# Patient Record
Sex: MALE | Race: WHITE | NOT HISPANIC OR LATINO | URBAN - METROPOLITAN AREA
[De-identification: names, ages, dates, MRNs, and addresses within clinical notes are randomized per-mention and may not be internally consistent; named-entity substitution may affect disease eponyms.]

---

## 2019-10-28 ENCOUNTER — TRANSFERRED RECORDS (OUTPATIENT)
Dept: HEALTH INFORMATION MANAGEMENT | Facility: CLINIC | Age: 24
End: 2019-10-28

## 2021-10-19 ENCOUNTER — TRANSFERRED RECORDS (OUTPATIENT)
Dept: HEALTH INFORMATION MANAGEMENT | Facility: CLINIC | Age: 26
End: 2021-10-19

## 2022-01-18 ENCOUNTER — TRANSFERRED RECORDS (OUTPATIENT)
Dept: HEALTH INFORMATION MANAGEMENT | Facility: CLINIC | Age: 27
End: 2022-01-18

## 2022-05-24 ENCOUNTER — TRANSFERRED RECORDS (OUTPATIENT)
Dept: HEALTH INFORMATION MANAGEMENT | Facility: CLINIC | Age: 27
End: 2022-05-24

## 2023-03-20 ENCOUNTER — TELEPHONE (OUTPATIENT)
Dept: PSYCHOLOGY | Facility: CLINIC | Age: 28
End: 2023-03-20
Payer: MEDICAID

## 2023-03-29 ENCOUNTER — OFFICE VISIT (OUTPATIENT)
Dept: FAMILY MEDICINE | Facility: CLINIC | Age: 28
End: 2023-03-29
Payer: MEDICAID

## 2023-03-29 VITALS
WEIGHT: 280 LBS | OXYGEN SATURATION: 97 % | DIASTOLIC BLOOD PRESSURE: 84 MMHG | HEART RATE: 114 BPM | RESPIRATION RATE: 18 BRPM | BODY MASS INDEX: 41.47 KG/M2 | SYSTOLIC BLOOD PRESSURE: 121 MMHG | TEMPERATURE: 98.5 F | HEIGHT: 69 IN

## 2023-03-29 DIAGNOSIS — Z01.00 VISIT FOR EYE AND VISION EXAM: Primary | ICD-10-CM

## 2023-03-29 DIAGNOSIS — F32.5 MAJOR DEPRESSIVE DISORDER IN FULL REMISSION, UNSPECIFIED WHETHER RECURRENT (H): ICD-10-CM

## 2023-03-29 DIAGNOSIS — F64.9 GENDER DYSPHORIA: ICD-10-CM

## 2023-03-29 DIAGNOSIS — F90.2 ATTENTION DEFICIT HYPERACTIVITY DISORDER (ADHD), COMBINED TYPE: ICD-10-CM

## 2023-03-29 DIAGNOSIS — F41.1 GAD (GENERALIZED ANXIETY DISORDER): ICD-10-CM

## 2023-03-29 DIAGNOSIS — Z76.89 ENCOUNTER TO ESTABLISH CARE: ICD-10-CM

## 2023-03-29 PROCEDURE — 99213 OFFICE O/P EST LOW 20 MIN: CPT | Mod: GC

## 2023-03-29 NOTE — PATIENT INSTRUCTIONS
Dental Clinic Resource List  Drafted October 2022    Name/Address/Phone/Hours Hours & Good info to know   [facility] - [place]  [address]  [place] [address]  Phone: [phone]  [email or web address]    Hours: Mon-Th: 730a-5p; Fri: 7a-4p.    *Offers Nitrous, oral and IV conscious sedation.    Serves Children & adults. *As of 10/2022 - not accepting new pediatric patients.    Income based dental plans available if no dental insurance.   Accepts MA & private insurance.   [facility] [place] [address] locations: [address] [place] [address] & [address] [place]  Phone: [phone]  Fax: [phone]  [email or web address]    Hours: M: 830a-5p; Tu: 830a - 8p; W: 830a-7p; Th: 830a-5p; Fri: 830a-5p; Sat: 9a-1p; Closed Sun. *Offers Nitrous Oxide    Serves Children & Adults.     Adults need to have an exam first and then will be seen again after that.     Accepts MA & Sliding scale. If using sliding scale they request most recent income tax forms (1040 or 1040a) & Pay stubs x 2 months for all working members in household.   [address] [place] [address]  Phone: [phone]  Fax: [phone]  [email or web address]    Hours: M-F: 730a - 330 pm. Closed Sat/Sun.  *Offers Nitrous Oxide    Serves Children & Adults    If uninsured - offers sliding scale based on income level & family size. Need to submit paycheck stubs & last year's income tax filing.   Accepts MA & private insurance.   [facility]  [address] [place] [address]  Phone: [phone]  Fax: [phone]  [email or web address]    Hours: M - Th: 815am - 5 pm; F: 815 am - 2pm. Closed Sat/Sun.  *Offers Nitrous Oxide    Serves Children & Adults    If uninsured - offers sliding scale based on income level & family size. Need to submit paycheck stubs & last year's income tax filing.   Accepts MA & private insurance.     [facility]  [facility]  [address] [place] [address]  Phone (Dental): [phone]  Main: [phone]  [email or web address]    Hours: Mon-Fri: 8a-430 pm. Closed Sat/Sun.   *Offers Nitrous Oxide    Walk in Dental services available.  Sliding fee Discount Program available if uninsured.   Assistance on site to help apply for MNSure, Medical Assistance and dental coverage for children.   Accepts MA, private insurance & Medicare.    [facility] [place]  [address]  [place] [address]  Phone: [phone]    [email or web address]    Hours: Mon-Fri: 730am - 430 pm. Closed Sat/Sun. *Offers Nitrous Oxide    Serves Children & Adults  *Limited availability for accepting new patients - mainly only accepting pediatrics as of 10/2022.    Accepts insured, uninsured and underinsured patients.    [facility]  [address]  [address]  [place] [address]  Phone: [phone]    [email or web address]    Hours: Mon - Thu: 8am - 9 pm, Fri: 8am 0- 4 pm. Closed Sat/Sun.   *Unknown if it offers nitrous oxide.    Will serve anyone who does not have dental insurance. All dental treatment is free. They request a $20 fee per visit for administrative costs.  No walk-in appointments.   [facility] [place]  [address] [place] [address]    [email or web address]    Hours: Mon: 830am- 5pm; T: 930 am-5pm; Wed-Fri: 830 am -5 pm. Closed Sat/Sun. *May offer Nitrous Oxide in certain circumstances.               [facility] [place]  [address] [place] [address]  Main: [phone]; Fax: [phone]  *Located in [facility] - Parking is Free in [address] off of [address]. Enter first floor entrance of the [facility] (Door [facility]). Take the elevator to  the 2nd floor and follow signs to the dental clinic.     [email or web address]    Hours: Hours vary by semester. Mid September- Mid December: Open Mondays, Tuesdays and Wednesdays. Late January-Early May: Open Mondays, Tuesday, Wednesdays, Thursdays. Fridays (Beginning early February)           *Offers Nitrous Oxide at no additional cost.    No insurance accepted. Cash/Visa/Mastercard/Discover/Check   [facility] [place]  [address] [place] [address]  Appt Line: [phone]    [email or web address]    Hours: Mon-F: 830 am - 5 pm. *As of 10/2022 - not currently accepting new dental patients.    Serves All ages.    Accepts most insurance and helps with benefits enrollment  Offers income-based discounts.  Helps arrange payment plans.   [facility]  Only at [place] - [address] [place] [address]  Main Line: [phone]    [email or web address]    Hours: M/W/Th/F: 8am - 5pm. No dental services Tuesday. Closed weekends.   *No dental walk-ins.    Sliding fee options available.          [facility] [place]  [address] [place] [address]  Phone: [phone]    Hours: M-F: 8am - 430 pm         *Does not offer nitrous oxide.    Accepts limited walk-in appointments.   [employer] [address]  Free parking behind the building for patients.    [facility] [place]  [address] [place] [address]  Dental line: [phone]  Fax: [phone]    [email or web address]    Hours: M-F: 7am -  5pm   *Does not offer Nitrous Oxide    Accepts public and private insurance.   Offers many dental services on a sliding fee scale.   [facility]  [address] [place] [address]  Phone: [phone]  Scheduling line  available 7am - 7pm.  Fax: [phone]    [email or web address]    Hours: M-F: 8am - 5pm. Tuesday & Wed evenings until 8 pm.  *Offers nitrous oxide for dental visits    Serves all ages.    Sliding fee discount program based on household income.    [facility] - [address] [address]  Phone: [phone]    [email or web address]/   *Does not offer nitrous oxide    Accepts MA.      Patient Education   Here is the plan from today's visit    1. Visit for eye and vision exam  - Adult Eye  Referral; Future    2. Major depressive disorder in full remission, unspecified whether recurrent (H)  - Adult Mental Health  Referral; Future  - Adult Mental Health  Referral; Future    3. Gender dysphoria  - Adult Mental Health  Referral; Future  - Adult Mental Health  Referral; Future    4. TANG (generalized anxiety disorder)  - Adult Mental Health  Referral; Future  - Adult Mental Health  Referral; Future    5. Attention deficit hyperactivity disorder (ADHD), combined type  - Adult Mental Health  Referral; Future  - Adult Mental Health  Referral; Future          Please call or return to clinic if your symptoms don't go away.    Follow up plan  Return in about 2 weeks (around 4/12/2023).    Thank you for coming to [facility] today.  Lab Testing:  **If you had lab testing today and your results are reassuring or normal they will be mailed to you or sent through [employer] within 7 days.   **If the lab tests need quick action we will call you with the results.  **If you are having labs done on a different day, please call [phone] to schedule at [facility] or [phone] for other [facility] Outpatient Lab locations. Labs do not offer walk-in appointments.  The phone number we will call with results is # [phone] (home) . If this is not the  best number please call our clinic and change the number.  Medication Refills:  If you need any refills please call your pharmacy and they will contact us.   If you need to  your refill at a new pharmacy, please contact the new pharmacy directly. The new pharmacy will help you get your medications transferred faster.   Scheduling:  If you have any concerns about today's visit or wish to schedule another appointment please call our office during normal business hours [phone] (8-5:00 M-F). If you can no longer make a scheduled visit, please cancel via [employer] or call us to cancel.   If a referral was made to an [facility] specialty provider and you do not get a call from central scheduling, please refer to directions on your visit summary or call our office during normal business hours for assistance.   If a Mammogram was ordered for you at the Breast Center call [phone] to schedule or change your appointment.  If you had an XRay/CT/Ultrasound/MRI ordered the number is [phone] to schedule or change your radiology appointment.   [facility] has limited ultrasound appointments available on Wednesdays, if you would like your ultrasound at [facility], please call [phone] to schedule.   Medical Concerns:  If you have urgent medical concerns please call [phone] at any time of the day.    Kaur Sanchez MD

## 2023-03-29 NOTE — PROGRESS NOTES
Assessment & Plan     Major depressive disorder in full remission, unspecified whether recurrent (H)  Has been stable on lexapro for multiple years.  Has been out of Lexapro for 4 months.  No current SI or thoughts of harming herself, has not had these thoughts for many years.  Cannot remember current dose of Lexapro.  -TRISH signed by patient, will represcribe Lexapro once records are obtained  - Adult Mental Health  Referral; Future    TANG (generalized anxiety disorder)  Anxiety and stress are her predominant symptoms that she is experiencing; however she reports that this has been pretty well controlled.  Has had 3 panic attacks in the past 6 months.  -TRISH signed by patient, will represcribe Lexapro once records are obtained  - Adult Mental Health  Referral; Future    Attention deficit hyperactivity disorder (ADHD), combined type  Has a history of ADHD diagnosed as a child, has been off her ADHD medication for over 10 years.  Does not have record of any ADHD testing.  Feels like she has been having trouble focusing and would like to restart HD medications.  Referral provided for ADHD testing,  - Adult Mental Health  Referral; Future    Gender dysphoria  History of gender dysphoria for many years, has been stable on estrogen and spironolactone.  Cannot remember the dosing, has been taking oral estrogen.  Has been happy with her body and does not desire any more changes right now.  -TRISH signed by patient, will represcribe estrogen once records are obtained  - Adult Mental [facility]  Referral; Future    Visit for eye and vision exam  Feels like her vision has been getting blurrier and harder to see objects at a distance.  No history of wearing contacts or glasses.  - Adult Eye  Referral; Future    Return in about 2 weeks (around 4/12/2023).   To follow up on mood, erectile dysfunction.    Kaur Sanchez MD  [facility] VALE Tomlin is a 27  year old, presenting for the following health issues:  Establish Care (Patient here to establish care)    Additional Questions 3/29/2023   Roomed by Sharri   Accompanied by Silviano (Partner)     Patient Reported Additional Medications 3/29/2023   Patient reports taking the following new medications Seroquel, estradol, spironolacton, welbutrin, lexapro (dosages need consideration)     HPI   -Patient has been living in [place] for 6 months; was living in [place] prior.  Here to establish care.  -Moved to be closer to her primary's family; currently lives in the same apartment building [place] the [address]  -History of MDD: Feels like this is currently very stable.  Does have a history of suicide attempt back in high school but has not felt this way in over 8 years.  Does not feel like depression is affecting her life currently.  No current SI or recent self-harm.  -Additionally has a history of anxiety and panic attacks.  Also feels like the symptoms are better than compared to what they used to be but could also be improved.  Has had 3 panic attacks in the past 6 months.  -Ran out of her medications 4 months ago and had not establish care yet to get refills.  Was taking Seroquel, Lexapro, estrogen, spironolactone, and Wellbutrin  -Has identified as female and she was in grade school.  Has been taking estrogen for 6 to 7 years.  Has been happy on her current dose.  Does report some problems with erectile dysfunction.  Was diagnosed with ADHD as a child, took medication for this but cannot remember what it was.  Has not been on medication for 10 years and does not have record of her diagnosis (she did ask her prior medical office if they have records of her diagnosis and I do not).  Would like to do ADHD testing so that she could potentially restart her medication as she feels like she has had a lack of trouble lately with focus and attention  -Was seeing a therapist in [place], felt like this was useful and would like a  "therapist now.  Has a history doing DBT 7 to 8 years ago, since then has been doing CBT        Objective    /84   Pulse 114   Temp 98.5  F (36.9  C) (Oral)   Resp 18   Ht 1.753 m (5' 9\")   Wt 127 kg (280 lb)   SpO2 97%   BMI 41.35 kg/m    Body mass index is 41.35 kg/m .  Physical Exam   GENERAL: healthy, alert and no distress  NECK: no adenopathy, no asymmetry, masses, or scars and thyroid normal to palpation  RESP: lungs clear to auscultation - no rales, rhonchi or wheezes  CV: regular rate and rhythm, normal S1 S2, no S3 or S4, no murmur, click or rub, no peripheral edema and peripheral pulses strong  ABDOMEN: soft, nontender, no hepatosplenomegaly, no masses and bowel sounds normal  MS: no gross musculoskeletal defects noted, no edema      ----- Service Performed and Documented by Resident or Fellow ------            "

## 2023-05-26 ENCOUNTER — OFFICE VISIT (OUTPATIENT)
Dept: FAMILY MEDICINE | Facility: CLINIC | Age: 28
End: 2023-05-26
Payer: COMMERCIAL

## 2023-05-26 VITALS
BODY MASS INDEX: 39.87 KG/M2 | SYSTOLIC BLOOD PRESSURE: 117 MMHG | OXYGEN SATURATION: 100 % | HEART RATE: 110 BPM | WEIGHT: 270 LBS | RESPIRATION RATE: 18 BRPM | DIASTOLIC BLOOD PRESSURE: 76 MMHG

## 2023-05-26 DIAGNOSIS — F41.1 GAD (GENERALIZED ANXIETY DISORDER): ICD-10-CM

## 2023-05-26 DIAGNOSIS — N52.1 ERECTILE DYSFUNCTION DUE TO DISEASES CLASSIFIED ELSEWHERE: ICD-10-CM

## 2023-05-26 DIAGNOSIS — F32.5 MAJOR DEPRESSIVE DISORDER IN FULL REMISSION, UNSPECIFIED WHETHER RECURRENT (H): ICD-10-CM

## 2023-05-26 DIAGNOSIS — Z79.890 HORMONE REPLACEMENT THERAPY: ICD-10-CM

## 2023-05-26 DIAGNOSIS — F64.9 GENDER DYSPHORIA: Primary | ICD-10-CM

## 2023-05-26 LAB
ALBUMIN SERPL BCG-MCNC: 4.4 G/DL (ref 3.5–5.2)
ALP SERPL-CCNC: 120 U/L (ref 35–129)
ALT SERPL W P-5'-P-CCNC: 99 U/L (ref 10–50)
AMPHETAMINES UR QL SCN: ABNORMAL
AST SERPL W P-5'-P-CCNC: 60 U/L (ref 10–50)
BARBITURATES UR QL SCN: ABNORMAL
BENZODIAZ UR QL SCN: ABNORMAL
BILIRUB DIRECT SERPL-MCNC: <0.2 MG/DL (ref 0–0.3)
BILIRUB SERPL-MCNC: 0.3 MG/DL
BZE UR QL SCN: ABNORMAL
CANNABINOIDS UR QL SCN: ABNORMAL
CHOLEST SERPL-MCNC: 200 MG/DL
ETHANOL UR QL SCN: ABNORMAL
FASTING STATUS PATIENT QL REPORTED: NO
GLUCOSE SERPL-MCNC: 95 MG/DL (ref 70–99)
HDLC SERPL-MCNC: 36 MG/DL
HGB BLD-MCNC: 15.1 G/DL (ref 11.7–17.7)
LDLC SERPL CALC-MCNC: 123 MG/DL
NONHDLC SERPL-MCNC: 164 MG/DL
OPIATES UR QL SCN: ABNORMAL
PCP QUAL URINE (ROCHE): ABNORMAL
PROT SERPL-MCNC: 7.8 G/DL (ref 6.4–8.3)
TRIGL SERPL-MCNC: 205 MG/DL

## 2023-05-26 PROCEDURE — 99214 OFFICE O/P EST MOD 30 MIN: CPT | Mod: GC

## 2023-05-26 PROCEDURE — 85018 HEMOGLOBIN: CPT

## 2023-05-26 PROCEDURE — 80061 LIPID PANEL: CPT

## 2023-05-26 PROCEDURE — 80307 DRUG TEST PRSMV CHEM ANLYZR: CPT

## 2023-05-26 PROCEDURE — 36415 COLL VENOUS BLD VENIPUNCTURE: CPT

## 2023-05-26 PROCEDURE — 82947 ASSAY GLUCOSE BLOOD QUANT: CPT

## 2023-05-26 PROCEDURE — 84403 ASSAY OF TOTAL TESTOSTERONE: CPT | Mod: KX

## 2023-05-26 PROCEDURE — 80076 HEPATIC FUNCTION PANEL: CPT

## 2023-05-26 RX ORDER — SPIRONOLACTONE 100 MG/1
100 TABLET, FILM COATED ORAL DAILY
Qty: 90 TABLET | Refills: 1 | Status: SHIPPED | OUTPATIENT
Start: 2023-05-26 | End: 2024-05-03

## 2023-05-26 RX ORDER — QUETIAPINE FUMARATE 50 MG/1
75 TABLET, FILM COATED ORAL AT BEDTIME
Qty: 30 TABLET | Refills: 0 | Status: SHIPPED | OUTPATIENT
Start: 2023-05-26 | End: 2023-09-15

## 2023-05-26 RX ORDER — SILDENAFIL CITRATE 20 MG/1
40 TABLET ORAL PRN
Qty: 60 TABLET | Refills: 0 | Status: SHIPPED | OUTPATIENT
Start: 2023-05-26 | End: 2023-09-15

## 2023-05-26 RX ORDER — LORAZEPAM 0.5 MG/1
0.5 TABLET ORAL EVERY 6 HOURS PRN
Qty: 10 TABLET | Refills: 0 | Status: SHIPPED | OUTPATIENT
Start: 2023-05-26 | End: 2024-09-30

## 2023-05-26 RX ORDER — ESTRADIOL 2 MG/1
6 TABLET ORAL DAILY
Qty: 270 TABLET | Refills: 0 | Status: SHIPPED | OUTPATIENT
Start: 2023-05-26 | End: 2024-04-25

## 2023-05-26 RX ORDER — QUETIAPINE FUMARATE 100 MG/1
100 TABLET, FILM COATED ORAL AT BEDTIME
COMMUNITY
Start: 2022-12-05 | End: 2023-09-15

## 2023-05-26 NOTE — PROGRESS NOTES
Assessment & Plan     Gender dysphoria  Hormone replacement therapy  Patient with a longstanding history of hormone replacement therapy from estradiol (6mg) and spironolactone (100mg).  She is happy with the changes that her body is made.  Was taking her medications for over 3 years prior to moving to [place] where she needed to establish care.  Has not taken her estradiol nor spironolactone in about a year.  Hoping to restart today.  She has been evaluated by psychiatry in the past has done well on her hormone therapy for many years, no active mood disorders that would disbar her from hormone therapy, no medical contraindications.  We will restart today with close follow-up.  We will also obtain baseline labs.  Obtained paper copies of her medication list that were reviewed.  - Hemoglobin; Future  - Lipid panel; Future  - Glucose; Future  - Hepatic panel; Future  - Testosterone total; Future  - Drug abuse screen 8 urine (UR); Future  - estradiol (ESTRACE) 2 MG tablet; Take 3 tablets (6 mg) by mouth daily for 90 days  - spironolactone (ALDACTONE) 100 MG tablet; Take 1 tablet (100 mg) by mouth daily    Erectile dysfunction due to diseases classified elsewhere  Experiences erectile dysfunction when she takes her hormone therapy.  Mostly a bigger problem for her partner and for her.  Discussed sildenafil from her, no medical contraindications.  Counseled on how to safely take medication, titrate down if experiencing side effects, titrate up if an effective dose.  - sildenafil (REVATIO) 20 MG tablet; Take 2 tablets (40 mg) by mouth as needed (for erectile dysfunction)    TANG (generalized anxiety disorder)  Major depressive disorder in full remission, unspecified whether recurrent (H)  Patient with a history of MDD, TANG, and panic attacks.  She had 1 hospitalization when she was in high school auditory and visual hallucinations, at that point was started on Seroquel.  She has been without any of her medications  including Lexapro or Wellbutrin for the past year.  She has been doing quite well without these medications, her mood is good, no pervasive depression or anxiety, no SI or self-harm.  Certainly no auditory or visual hallucinations since that 1 time period in high school.  Given that she has been so stable without her Lexapro or Wellbutrin, will hold from restarting these medications.  She would like to titrate down on her Seroquel as she does not like the metabolic effects.  Given that her mood is so stable and she essentially has been self t titrating down by only taking this medication every other night or so given that she was on her medications, I think it is okay to slowly decrease dose.  We will titrate down to 75 mg with plan for close follow-up.  Lastly, in the past she has used a very small dose of Ativan (0.5mg) only when she has a panic attack.  We will check a UDS today and provide small prescription.  If the number of panic attacks increase for her, reinitiate an SSRI.  - QUEtiapine (SEROQUEL) 50 MG tablet; Take 1.5 tablets (75 mg) by mouth At Bedtime for 30 days  - LORazepam (ATIVAN) 0.5 MG tablet; Take 1 tablet (0.5 mg) by mouth every 6 hours as needed for anxiety  - UDS        Return in about 1 month (around 6/26/2023).  To check in on mood symptoms.    Kaur Sanchez MD  [facility] VALE Randall is a 27 year old, presenting for the following health issues:  RECHECK (Patient here for follow up)        5/26/2023     3:56 PM   Additional Questions   Roomed by Sharri   Accompanied by Silviano (Partner)     HPI   -been only hayde seroqeul, only taking every few nights because running out.  -had some visual hallucinations and auditory back in high school  -Has not been taking any of her medications for a year be on Seroquel 100 mg  -Mood has been really good this past year, no SI, no self-harm, 3 panic attacks in the past year which for her is well controlled          Objective     /76 (BP Location: Left arm, Patient Position: Sitting, Cuff Size: Adult Large)   Pulse 110   Resp 18   Wt 122.5 kg (270 lb)   SpO2 100%   BMI 39.87 kg/m    Body mass index is 39.87 kg/m .  Physical Exam   GENERAL: healthy, alert and no distress  EYES: Eyes grossly normal to inspection  NECK: no asymmetry, masses, or scars  MS: no gross musculoskeletal defects noted, no edema  SKIN: no suspicious lesions or rashes  NEURO: Normal strength and tone, mentation intact and speech normal  PSYCH: mentation appears normal, affect normal/bright      ----- Service Performed and Documented by Resident or Fellow ------

## 2023-05-26 NOTE — PATIENT INSTRUCTIONS
Patient Education   Here is the plan from today's visit    1. Gender dysphoria  - Hemoglobin; Future  - Lipid panel; Future  - Glucose; Future  - Hepatic panel; Future  - Testosterone total; Future  - Drug abuse screen 8 urine (UR); Future  - estradiol (ESTRACE) 2 MG tablet; Take 3 tablets (6 mg) by mouth daily for 90 days  Dispense: 270 tablet; Refill: 0  - spironolactone (ALDACTONE) 100 MG tablet; Take 1 tablet (100 mg) by mouth daily  Dispense: 90 tablet; Refill: 1  - Hemoglobin  - Lipid panel  - Glucose  - Hepatic panel  - Testosterone total  - Drug abuse screen 8 urine (UR)    2. Hormone replacement therapy  - Hemoglobin; Future  - Lipid panel; Future  - Glucose; Future  - Hepatic panel; Future  - Testosterone total; Future  - Drug abuse screen 8 urine (UR); Future  - estradiol (ESTRACE) 2 MG tablet; Take 3 tablets (6 mg) by mouth daily for 90 days  Dispense: 270 tablet; Refill: 0  - spironolactone (ALDACTONE) 100 MG tablet; Take 1 tablet (100 mg) by mouth daily  Dispense: 90 tablet; Refill: 1  - Hemoglobin  - Lipid panel  - Glucose  - Hepatic panel  - Testosterone total  - Drug abuse screen 8 urine (UR)    3. Erectile dysfunction due to diseases classified elsewhere  - sildenafil (REVATIO) 20 MG tablet; Take 2 tablets (40 mg) by mouth as needed (for erectile dysfunction)  Dispense: 60 tablet; Refill: 0    4. TANG (generalized anxiety disorder)  - QUEtiapine (SEROQUEL) 50 MG tablet; Take 1.5 tablets (75 mg) by mouth At Bedtime for 30 days  Dispense: 30 tablet; Refill: 0  - LORazepam (ATIVAN) 0.5 MG tablet; Take 1 tablet (0.5 mg) by mouth every 6 hours as needed for anxiety  Dispense: 10 tablet; Refill: 0    5. Major depressive disorder in full remission, unspecified whether recurrent (H)  - QUEtiapine (SEROQUEL) 50 MG tablet; Take 1.5 tablets (75 mg) by mouth At Bedtime for 30 days  Dispense: 30 tablet; Refill: 0          Please call or return to clinic if your symptoms don't go away.    Follow up plan  No  follow-ups on file.    Thank you for coming to [facility] today.  Lab Testing:  **If you had lab testing today and your results are reassuring or normal they will be mailed to you or sent through [employer] within 7 days.   **If the lab tests need quick action we will call you with the results.  **If you are having labs done on a different day, please call [phone] to schedule at [facility] or [phone] for other [facility] Outpatient Lab locations. Labs do not offer walk-in appointments.  The phone number we will call with results is # [phone] (home) . If this is not the best number please call our clinic and change the number.  Medication Refills:  If you need any refills please call your pharmacy and they will contact us.   If you need to  your refill at a new pharmacy, please contact the new pharmacy directly. The new pharmacy will help you get your medications transferred faster.   Scheduling:  If you have any concerns about today's visit or wish to schedule another appointment please call our office during normal business hours [phone] (8-5:00 M-F). If you can no longer make a scheduled visit, please cancel via [employer] or call us to cancel.   If a referral was made to an [facility] specialty provider and you do not get a call from central scheduling, please refer to directions on your visit summary or call our office during normal business hours for assistance.   If a Mammogram was ordered for you at the Breast Center call [phone] to schedule or change your appointment.  If you had an XRay/CT/Ultrasound/MRI ordered the number is [phone] to schedule or change your radiology appointment.   [facility] has limited ultrasound appointments available on Wednesdays, if you would like your ultrasound at [facility], please call [phone] to schedule.   Medical Concerns:  If you have urgent medical concerns please call [phone] at any time of the  day.    Kaur Sanchez MD

## 2023-05-26 NOTE — LETTER
June 7, 2023      Tonia Rosas  [address] [place] [address]        Dear ,    We are writing to inform you of your test results. Your cholesterol levels and liver enzyme were a little high which is likely related to your diet. When people take in more saturated fats than their body can handle, their cholesterol becomes elevated and their liver can have small injuries to it, which can make liver enzymes elevated. I recommend working on cutting out some unhealthy foods in your diet and continuing to work on moving your body as best you can this summer--walking, biking, swimming, or whatever works for you. We will continue to monitor these labs and likely re-check at your next appointment.    Resulted Orders   Hemoglobin   Result Value Ref Range    Hemoglobin 15.1 11.7 - 17.7 g/dL      Comment:      Sex Specific Reference Ranges:    Female  11.7-15.7  g/dL  Male    13.3-17.7   g/dL      Narrative    The sex of this patient cannot be reliably determined based on discrepancies in demographics (legal sex, sex assigned at birth, gender identity).  Both male and female reference ranges are provided where applicable.  Careful evaluation of the patient s results as compared to the gender specific reference intervals is required in this setting.    Lipid panel   Result Value Ref Range    Cholesterol 200 (H) <200 mg/dL    Triglycerides 205 (H) <150 mg/dL    Direct Measure HDL 36 (L) >=40 mg/dL    LDL Cholesterol Calculated 123 (H) <=100 mg/dL    Non HDL Cholesterol 164 (H) <130 mg/dL    Narrative    Cholesterol  Desirable:  <200 mg/dL    Triglycerides  Normal:  Less than 150 mg/dL  Borderline High:  150-199 mg/dL  High:  200-499 mg/dL  Very High:  Greater than or equal to 500 mg/dL    Direct Measure HDL  Female:  Greater than or equal to 50 mg/dL   Male:  Greater than or equal to 40 mg/dL    LDL Cholesterol  Desirable:  <100mg/dL  Above Desirable:  100-129 mg/dL   Borderline High:  130-159 mg/dL   High:   160-189 mg/dL   Very High:  >= 190 mg/dL    Non HDL Cholesterol  Desirable:  130 mg/dL  Above Desirable:  130-159 mg/dL  Borderline High:  160-189 mg/dL  High:  190-219 mg/dL  Very High:  Greater than or equal to 220 mg/dL   Glucose   Result Value Ref Range    Glucose 95 70 - 99 mg/dL    Patient Fasting > 8hrs? No    Hepatic panel   Result Value Ref Range    Protein Total 7.8 6.4 - 8.3 g/dL    Albumin 4.4 3.5 - 5.2 g/dL    Bilirubin Total 0.3 <=1.2 mg/dL    Alkaline Phosphatase 120 35 - 129 U/L      Comment:      Female:    0-15 days     U/L  15d-1 year   122-469 U/L  1-10 years   142-335 U/L  10-13 years  129-417 U/L  13-15 years   U/L  15-17 years   U/L  17-19 years  45-87 U/L  19 years and older   U/L      Male:  0-15 days     U/L  15d-1 year   122-469 U/L  1-10 years   142-335 U/L  10-13 years  129-417 U/L  13-15 years  116-468 U/L  15-17 years   U/L  17-19 years   U/L  19 years and older   U/L      AST 60 (H) 10 - 50 U/L      Comment:      Female   All ages 10-35 U/L     Male   All ages 10-50 U/L        ALT 99 (H) 10 - 50 U/L      Comment:      Female   All ages 10-35 U/L     Male   All ages 10-50 U/L        Bilirubin Direct <0.20 0.00 - 0.30 mg/dL    Narrative    The sex of this patient cannot be reliably determined based on discrepancies in demographics (legal sex, sex assigned at birth, gender identity).  Both male and female reference ranges are provided where applicable.  Careful evaluation of the patient s results as compared to the gender specific reference intervals is required in this setting.    Testosterone total   Result Value Ref Range    Testosterone Total 326 8 - 950 ng/dL    Narrative    The sex of this patient cannot be reliably determined based on discrepancies in demographics (legal sex, sex assigned at birth, gender identity).  Both male and female reference ranges are provided where applicable.  Careful evaluation of the patient s results as  compared to the gender specific reference intervals is required in this setting.    Drug abuse screen 8 urine (UR)   Result Value Ref Range    Amphetamines Urine Screen Positive (A) Screen Negative      Comment:      Cutoff for a positive amphetamine is 500 ng/mL or greater.   This is an unconfirmed screening result to be used for medical purposes only.    Barbituates Urine Screen Negative Screen Negative      Comment:      Cutoff for a negative barbiturate is less than 200 ng/mL.    Benzodiazepine Urine Screen Negative Screen Negative      Comment:      Cutoff for a negative benzodiazepine is less than 100 ng/mL.    Cannabinoids Urine Screen Positive (A) Screen Negative      Comment:      Cutoff for a positive cannabinoid is 50 ng/mL or greater.   This is an unconfirmed screening result to be used for medical purposes only.    Cocaine Urine Screen Negative Screen Negative      Comment:      Cutoff for a negative cocaine is less than 300 ng/mL.    Ethanol Urine Screen Negative Screen Negative      Comment:      Cutoff for a negative urine ethanol is less than 0.05 g/dL.    Opiates Urine Screen Negative Screen Negative      Comment:      Cutoff for a negative opiate is less than 300 ng/mL.    PCP Urine Screen Negative Screen Negative      Comment:      Cutoff for a negative PCP is less than 25 ng/mL.       If you have any questions or concerns, please call the clinic at the number listed above.       Sincerely,    Kaur Sanchez MD

## 2023-05-31 LAB — TESTOST SERPL-MCNC: 326 NG/DL (ref 8–950)

## 2023-09-15 ENCOUNTER — OFFICE VISIT (OUTPATIENT)
Dept: FAMILY MEDICINE | Facility: CLINIC | Age: 28
End: 2023-09-15
Payer: COMMERCIAL

## 2023-09-15 VITALS
SYSTOLIC BLOOD PRESSURE: 128 MMHG | BODY MASS INDEX: 36.14 KG/M2 | OXYGEN SATURATION: 97 % | RESPIRATION RATE: 16 BRPM | TEMPERATURE: 98.2 F | HEART RATE: 85 BPM | HEIGHT: 69 IN | WEIGHT: 244 LBS | DIASTOLIC BLOOD PRESSURE: 85 MMHG

## 2023-09-15 DIAGNOSIS — Z11.59 NEED FOR HEPATITIS C SCREENING TEST: ICD-10-CM

## 2023-09-15 DIAGNOSIS — N52.1 ERECTILE DYSFUNCTION DUE TO DISEASES CLASSIFIED ELSEWHERE: ICD-10-CM

## 2023-09-15 DIAGNOSIS — R03.0 ELEVATED BLOOD PRESSURE READING WITHOUT DIAGNOSIS OF HYPERTENSION: ICD-10-CM

## 2023-09-15 DIAGNOSIS — Z11.4 SCREENING FOR HIV (HUMAN IMMUNODEFICIENCY VIRUS): ICD-10-CM

## 2023-09-15 DIAGNOSIS — E78.5 HYPERLIPIDEMIA LDL GOAL <100: ICD-10-CM

## 2023-09-15 DIAGNOSIS — R79.89 ELEVATED LFTS: ICD-10-CM

## 2023-09-15 DIAGNOSIS — Z00.00 PREVENTATIVE HEALTH CARE: ICD-10-CM

## 2023-09-15 DIAGNOSIS — F32.5 MAJOR DEPRESSIVE DISORDER IN FULL REMISSION, UNSPECIFIED WHETHER RECURRENT (H): ICD-10-CM

## 2023-09-15 DIAGNOSIS — F64.9 GENDER DYSPHORIA: ICD-10-CM

## 2023-09-15 DIAGNOSIS — F41.1 GAD (GENERALIZED ANXIETY DISORDER): Primary | ICD-10-CM

## 2023-09-15 LAB
CREAT UR-MCNC: 213 MG/DL
MICROALBUMIN UR-MCNC: 14.4 MG/L
MICROALBUMIN/CREAT UR: 6.76 MG/G CR (ref 0–25)

## 2023-09-15 PROCEDURE — 82043 UR ALBUMIN QUANTITATIVE: CPT | Performed by: STUDENT IN AN ORGANIZED HEALTH CARE EDUCATION/TRAINING PROGRAM

## 2023-09-15 PROCEDURE — 80053 COMPREHEN METABOLIC PANEL: CPT | Performed by: STUDENT IN AN ORGANIZED HEALTH CARE EDUCATION/TRAINING PROGRAM

## 2023-09-15 PROCEDURE — 82248 BILIRUBIN DIRECT: CPT | Performed by: STUDENT IN AN ORGANIZED HEALTH CARE EDUCATION/TRAINING PROGRAM

## 2023-09-15 PROCEDURE — 99214 OFFICE O/P EST MOD 30 MIN: CPT | Mod: 25 | Performed by: STUDENT IN AN ORGANIZED HEALTH CARE EDUCATION/TRAINING PROGRAM

## 2023-09-15 PROCEDURE — 36415 COLL VENOUS BLD VENIPUNCTURE: CPT | Performed by: STUDENT IN AN ORGANIZED HEALTH CARE EDUCATION/TRAINING PROGRAM

## 2023-09-15 PROCEDURE — 80061 LIPID PANEL: CPT | Performed by: STUDENT IN AN ORGANIZED HEALTH CARE EDUCATION/TRAINING PROGRAM

## 2023-09-15 PROCEDURE — 90651 9VHPV VACCINE 2/3 DOSE IM: CPT | Performed by: STUDENT IN AN ORGANIZED HEALTH CARE EDUCATION/TRAINING PROGRAM

## 2023-09-15 PROCEDURE — 90471 IMMUNIZATION ADMIN: CPT | Performed by: STUDENT IN AN ORGANIZED HEALTH CARE EDUCATION/TRAINING PROGRAM

## 2023-09-15 PROCEDURE — 90715 TDAP VACCINE 7 YRS/> IM: CPT | Performed by: STUDENT IN AN ORGANIZED HEALTH CARE EDUCATION/TRAINING PROGRAM

## 2023-09-15 PROCEDURE — 87389 HIV-1 AG W/HIV-1&-2 AB AG IA: CPT | Performed by: STUDENT IN AN ORGANIZED HEALTH CARE EDUCATION/TRAINING PROGRAM

## 2023-09-15 PROCEDURE — 90472 IMMUNIZATION ADMIN EACH ADD: CPT | Performed by: STUDENT IN AN ORGANIZED HEALTH CARE EDUCATION/TRAINING PROGRAM

## 2023-09-15 PROCEDURE — 86803 HEPATITIS C AB TEST: CPT | Performed by: STUDENT IN AN ORGANIZED HEALTH CARE EDUCATION/TRAINING PROGRAM

## 2023-09-15 PROCEDURE — 90746 HEPB VACCINE 3 DOSE ADULT IM: CPT | Performed by: STUDENT IN AN ORGANIZED HEALTH CARE EDUCATION/TRAINING PROGRAM

## 2023-09-15 PROCEDURE — 82570 ASSAY OF URINE CREATININE: CPT | Performed by: STUDENT IN AN ORGANIZED HEALTH CARE EDUCATION/TRAINING PROGRAM

## 2023-09-15 RX ORDER — TADALAFIL 10 MG/1
10 TABLET ORAL DAILY PRN
Qty: 10 TABLET | Refills: 0 | Status: SHIPPED | OUTPATIENT
Start: 2023-09-15 | End: 2024-05-03

## 2023-09-15 RX ORDER — LORAZEPAM 0.5 MG/1
0.5 TABLET ORAL EVERY 6 HOURS PRN
Qty: 10 TABLET | Refills: 0 | Status: CANCELLED | OUTPATIENT
Start: 2023-09-15

## 2023-09-15 RX ORDER — SERTRALINE HYDROCHLORIDE 25 MG/1
TABLET, FILM COATED ORAL
Qty: 98 TABLET | Refills: 0 | Status: SHIPPED | OUTPATIENT
Start: 2023-09-15 | End: 2023-11-01

## 2023-09-15 RX ORDER — ESCITALOPRAM OXALATE 5 MG/1
TABLET ORAL
Qty: 42 TABLET | Refills: 0 | Status: SHIPPED | OUTPATIENT
Start: 2023-09-15 | End: 2023-10-06

## 2023-09-15 RX ORDER — ESCITALOPRAM OXALATE 20 MG/1
20 TABLET ORAL EVERY MORNING
COMMUNITY
Start: 2022-12-03 | End: 2024-09-30

## 2023-09-15 RX ORDER — BUPROPION HYDROCHLORIDE 150 MG/1
150 TABLET ORAL EVERY MORNING
Qty: 90 TABLET | Refills: 3 | Status: SHIPPED | OUTPATIENT
Start: 2023-09-15 | End: 2024-09-30

## 2023-09-15 RX ORDER — BUPROPION HYDROCHLORIDE 150 MG/1
150 TABLET ORAL EVERY MORNING
COMMUNITY
Start: 2022-12-05 | End: 2023-09-15

## 2023-09-15 ASSESSMENT — ANXIETY QUESTIONNAIRES
6. BECOMING EASILY ANNOYED OR IRRITABLE: SEVERAL DAYS
5. BEING SO RESTLESS THAT IT IS HARD TO SIT STILL: MORE THAN HALF THE DAYS
2. NOT BEING ABLE TO STOP OR CONTROL WORRYING: NEARLY EVERY DAY
1. FEELING NERVOUS, ANXIOUS, OR ON EDGE: NEARLY EVERY DAY
3. WORRYING TOO MUCH ABOUT DIFFERENT THINGS: NEARLY EVERY DAY
7. FEELING AFRAID AS IF SOMETHING AWFUL MIGHT HAPPEN: MORE THAN HALF THE DAYS
GAD7 TOTAL SCORE: 17
IF YOU CHECKED OFF ANY PROBLEMS ON THIS QUESTIONNAIRE, HOW DIFFICULT HAVE THESE PROBLEMS MADE IT FOR YOU TO DO YOUR WORK, TAKE CARE OF THINGS AT HOME, OR GET ALONG WITH OTHER PEOPLE: VERY DIFFICULT
GAD7 TOTAL SCORE: 17

## 2023-09-15 ASSESSMENT — PATIENT HEALTH QUESTIONNAIRE - PHQ9
SUM OF ALL RESPONSES TO PHQ QUESTIONS 1-9: 17
5. POOR APPETITE OR OVEREATING: NEARLY EVERY DAY

## 2023-09-15 NOTE — PATIENT INSTRUCTIONS
Second Monkeypox vaccine at [facility]    Get your initial COVID vaccines at [facility] or a pharmacy    Changing medication    Week 1: Zoloft: 25 mg, Lexapro 15 mg  Week 2: Zoloft 50 mg, Lexapro 10 mg  Week 3: Zoloft 75 mg, Lexapro 5 mg  Week 4+: Zoloft 100 mg    Check in via Mychart in 2 weeks.  Visit in 4 weeks, in person, bring in your blood pressure cuff    Meanwhile, restart your lamin

## 2023-09-15 NOTE — PROGRESS NOTES
Assessment & Plan     TANG (generalized anxiety disorder)  Major depressive disorder in full remission, unspecified whether recurrent (H)  On max dose lexapro with significant TANG symptoms still. Anxiety > depression. Discussed we can trial a different medication. Will trial Zoloft (cross taper plan below). Could also consider increasing wellbutrin dose as well. Plan for patient to check in via mychart in 2 weeks with follow in 4 weeks.    Week 1: Zoloft: 25 mg, Lexapro 15 mg  Week 2: Zoloft 50 mg, Lexapro 10 mg  Week 3: Zoloft 75 mg, Lexapro 5 mg  Week 4+: Zoloft 100 mg    - buPROPion (WELLBUTRIN XL) 150 MG 24 hr tablet  Dispense: 90 tablet; Refill: 3  - escitalopram (LEXAPRO) 5 MG tablet  Dispense: 42 tablet; Refill: 0  - sertraline (ZOLOFT) 25 MG tablet  Dispense: 98 tablet; Refill: 0    Gender dysphoria  Recheck labs today. Restart lamin. Follow up in 4 weeks; will likely restart estrogen at that point    Erectile dysfunction due to diseases classified elsewhere  Viagra was too expensive. ED 2/2 testosterone used for treatment of gender dysphoria. Will trial cialis. Prefers as needed instead of daily.  - tadalafil (CIALIS) 10 MG tablet  Dispense: 10 tablet; Refill: 0    Elevated LFTs  Previously elevated. Will recheck today  - Hepatic panel    Hyperlipidemia LDL goal <100  Previously elevated. Will recheck today  - Lipid panel reflex to direct LDL Non-fasting    Elevated blood pressure reading without diagnosis of hypertension  Given elevated blood pressure at home; certainly could be an inaccurate cuff size, poorly calibrated machine, or masked hypertension. Plan to have patient bring in machine at next visit for calibration and check kidney function today.  - Basic metabolic panel  - Albumin Random Urine Quantitative with Creat Ratio    Preventative health care  - HEPATITIS B VACCINE ADULT 3 DOSE IM (ENGERIX-B/RECOMBIVAX HB)  - HPV9 (GARDASIL 9)  - HIV Screening  - Hepatitis C Screen Reflex to HCV RNA Quant  "and Genotype  - TDAP 10-64Y (ADACEL,BOOSTRIX)     Screening for HIV (human immunodeficiency virus)  - HIV Screening    Need for hepatitis C screening test  - Hepatitis C Screen Reflex to HCV RNA Quant and Genotype      Return in about 4 weeks (around 10/13/2023) for Follow up mental health.    Fabiola Burnett MD  [facility] VALE Randall is a 27 year old, presenting for the following health issues:  Stress (Higher than normal blood pressure 125-160 systolic measurement with at home BP reader. Tightness in chest. Trouble breathing. Can't get a full breath of air in. Drowsiness. Symptoms for over a month. Sensitive to foods. Unintentional Weight loss)      9/15/2023     2:03 PM   Additional Questions   Roomed by jose alberto   Accompanied by self       HPI     Last seen 5/2023, was doing well gilbert and mdd stand point, so was stopped on lexapro and wellbutrin as she had been without prior to that appointment     RN mom suggested that she restart her lexapro (10 mgx 1 week, now on 20 mg) and Wellbutrin (150 mg)   - helped with some of the symptoms    Has been measuring -150 at home    Chest tightness, not associated with exercise, symptoms improve with exercise (breathing and tightness)  270# at last appointment, #244 today    Anxiety > depression    Just started school, got a roommate, and lots of changes when all this happened        Objective    /85   Pulse 85   Temp 98.2  F (36.8  C) (Oral)   Resp 16   Ht 1.753 m (5' 9\")   Wt 110.7 kg (244 lb)   SpO2 97%   BMI 36.03 kg/m    Body mass index is 36.03 kg/m .  Physical Exam  Vitals reviewed.   Constitutional:       General: She is not in acute distress.     Appearance: Normal appearance. She is not ill-appearing, toxic-appearing or diaphoretic.   HENT:      Head: Normocephalic and atraumatic.   Eyes:      Conjunctiva/sclera: Conjunctivae normal.   Cardiovascular:      Rate and Rhythm: Normal rate.   Pulmonary:      Effort: Pulmonary " effort is normal. No respiratory distress.   Neurological:      Mental Status: She is alert.   Psychiatric:         Mood and Affect: Mood normal.         Behavior: Behavior normal.         Thought Content: Thought content normal.         Judgment: Judgment normal.

## 2023-09-16 LAB
ALBUMIN SERPL BCG-MCNC: 4.5 G/DL (ref 3.5–5.2)
ALP SERPL-CCNC: 94 U/L (ref 35–129)
ALT SERPL W P-5'-P-CCNC: 38 U/L (ref 0–70)
ANION GAP SERPL CALCULATED.3IONS-SCNC: 11 MMOL/L (ref 7–15)
AST SERPL W P-5'-P-CCNC: 25 U/L (ref 0–45)
BILIRUB DIRECT SERPL-MCNC: <0.2 MG/DL (ref 0–0.3)
BILIRUB SERPL-MCNC: 0.3 MG/DL
BUN SERPL-MCNC: 11.1 MG/DL (ref 6–20)
CALCIUM SERPL-MCNC: 9.7 MG/DL (ref 8.6–10)
CHLORIDE SERPL-SCNC: 106 MMOL/L (ref 98–107)
CHOLEST SERPL-MCNC: 169 MG/DL
CREAT SERPL-MCNC: 0.75 MG/DL (ref 0.51–1.17)
DEPRECATED HCO3 PLAS-SCNC: 22 MMOL/L (ref 22–29)
EGFRCR SERPLBLD CKD-EPI 2021: >90 ML/MIN/1.73M2
GLUCOSE SERPL-MCNC: 119 MG/DL (ref 70–99)
HDLC SERPL-MCNC: 36 MG/DL
LDLC SERPL CALC-MCNC: 94 MG/DL
NONHDLC SERPL-MCNC: 133 MG/DL
POTASSIUM SERPL-SCNC: 4.2 MMOL/L (ref 3.4–5.3)
PROT SERPL-MCNC: 7.5 G/DL (ref 6.4–8.3)
SODIUM SERPL-SCNC: 139 MMOL/L (ref 136–145)
TRIGL SERPL-MCNC: 193 MG/DL

## 2023-09-18 ENCOUNTER — VIRTUAL VISIT (OUTPATIENT)
Dept: PSYCHOLOGY | Facility: CLINIC | Age: 28
End: 2023-09-18
Attending: FAMILY MEDICINE
Payer: COMMERCIAL

## 2023-09-18 DIAGNOSIS — F33.0 MAJOR DEPRESSIVE DISORDER, RECURRENT EPISODE, MILD (H): Primary | ICD-10-CM

## 2023-09-18 DIAGNOSIS — F64.9 GENDER DYSPHORIA: ICD-10-CM

## 2023-09-18 DIAGNOSIS — F41.1 GAD (GENERALIZED ANXIETY DISORDER): ICD-10-CM

## 2023-09-18 LAB — HIV 1+2 AB+HIV1 P24 AG SERPL QL IA: NONREACTIVE

## 2023-09-18 PROCEDURE — 90791 PSYCH DIAGNOSTIC EVALUATION: CPT | Mod: 95 | Performed by: PSYCHOLOGIST

## 2023-09-18 ASSESSMENT — COLUMBIA-SUICIDE SEVERITY RATING SCALE - C-SSRS
1. IN THE PAST MONTH, HAVE YOU WISHED YOU WERE DEAD OR WISHED YOU COULD GO TO SLEEP AND NOT WAKE UP?: NO
2. HAVE YOU ACTUALLY HAD ANY THOUGHTS OF KILLING YOURSELF?: YES
6. HAVE YOU EVER DONE ANYTHING, STARTED TO DO ANYTHING, OR PREPARED TO DO ANYTHING TO END YOUR LIFE?: NO
4. HAVE YOU HAD THESE THOUGHTS AND HAD SOME INTENTION OF ACTING ON THEM?: YES
3. HAVE YOU BEEN THINKING ABOUT HOW YOU MIGHT KILL YOURSELF?: YES
LETHALITY/MEDICAL DAMAGE CODE FIRST ACTUAL ATTEMPT: NO PHYSICAL DAMAGE OR VERY MINOR PHYSICAL DAMAGE
2. HAVE YOU ACTUALLY HAD ANY THOUGHTS OF KILLING YOURSELF?: NO
LETHALITY/MEDICAL DAMAGE CODE MOST RECENT ACTUAL ATTEMPT: NO PHYSICAL DAMAGE OR VERY MINOR PHYSICAL DAMAGE
1. HAVE YOU WISHED YOU WERE DEAD OR WISHED YOU COULD GO TO SLEEP AND NOT WAKE UP?: YES
LETHALITY/MEDICAL DAMAGE CODE MOST RECENT POTENTIAL ATTEMPT: BEHAVIOR NOT LIKELY TO RESULT IN INJURY
TOTAL  NUMBER OF INTERRUPTED ATTEMPTS LIFETIME: NO
ATTEMPT LIFETIME: YES
TOTAL  NUMBER OF ABORTED OR SELF INTERRUPTED ATTEMPTS LIFETIME: NO
4. HAVE YOU HAD THESE THOUGHTS AND HAD SOME INTENTION OF ACTING ON THEM?: NO
5. HAVE YOU STARTED TO WORK OUT OR WORKED OUT THE DETAILS OF HOW TO KILL YOURSELF? DO YOU INTEND TO CARRY OUT THIS PLAN?: YES
ATTEMPT PAST THREE MONTHS: NO
LETHALITY/MEDICAL DAMAGE CODE MOST LETHAL ACTUAL ATTEMPT: MODERATELY SEVERE PHYSICAL DAMAGE, MEDICAL HOSPITALIZATION AND LIKELY INTENSIVE CARE REQUIRED
LETHALITY/MEDICAL DAMAGE CODE MOST LETHAL POTENTIAL ATTEMPT: BEHAVIOR NOT LIKELY TO RESULT IN INJURY
LETHALITY/MEDICAL DAMAGE CODE FIRST POTENTIAL ATTEMPT: BEHAVIOR NOT LIKELY TO RESULT IN INJURY
5. HAVE YOU STARTED TO WORK OUT OR WORKED OUT THE DETAILS OF HOW TO KILL YOURSELF? DO YOU INTEND TO CARRY OUT THIS PLAN?: NO
REASONS FOR IDEATION LIFETIME: MOSTLY TO END OR STOP THE PAIN (YOU COULDN'T GO ON LIVING WITH THE PAIN OR HOW YOU WERE FEELING)

## 2023-09-18 ASSESSMENT — ANXIETY QUESTIONNAIRES
6. BECOMING EASILY ANNOYED OR IRRITABLE: MORE THAN HALF THE DAYS
1. FEELING NERVOUS, ANXIOUS, OR ON EDGE: MORE THAN HALF THE DAYS
3. WORRYING TOO MUCH ABOUT DIFFERENT THINGS: MORE THAN HALF THE DAYS
IF YOU CHECKED OFF ANY PROBLEMS ON THIS QUESTIONNAIRE, HOW DIFFICULT HAVE THESE PROBLEMS MADE IT FOR YOU TO DO YOUR WORK, TAKE CARE OF THINGS AT HOME, OR GET ALONG WITH OTHER PEOPLE: VERY DIFFICULT
7. FEELING AFRAID AS IF SOMETHING AWFUL MIGHT HAPPEN: SEVERAL DAYS
GAD7 TOTAL SCORE: 13
2. NOT BEING ABLE TO STOP OR CONTROL WORRYING: MORE THAN HALF THE DAYS
5. BEING SO RESTLESS THAT IT IS HARD TO SIT STILL: MORE THAN HALF THE DAYS
GAD7 TOTAL SCORE: 13
4. TROUBLE RELAXING: MORE THAN HALF THE DAYS

## 2023-09-18 ASSESSMENT — PATIENT HEALTH QUESTIONNAIRE - PHQ9
SUM OF ALL RESPONSES TO PHQ QUESTIONS 1-9: 8
SUM OF ALL RESPONSES TO PHQ QUESTIONS 1-9: 8
10. IF YOU CHECKED OFF ANY PROBLEMS, HOW DIFFICULT HAVE THESE PROBLEMS MADE IT FOR YOU TO DO YOUR WORK, TAKE CARE OF THINGS AT HOME, OR GET ALONG WITH OTHER PEOPLE: VERY DIFFICULT

## 2023-09-18 NOTE — PROGRESS NOTES
[facility] Adult Mental Health Day Treatment      PATIENT'S NAME: Sada Paredes  PREFERRED NAME: Tonia  PRONOUNS: she/her/hers     MRN: [id number]  : 1995  ADDRESS: [address] [place] [address]  [facility]  [id number]  DATE OF SERVICE: 23  START TIME: 1:00  END TIME: 1:29  PREFERRED PHONE: [phone]  May we leave a program related message: Yes  SERVICE MODALITY:  Video Visit:      Provider verified identity through the following two step process.  Patient provided:  Patient     Telemedicine Visit: The patient's condition can be safely assessed and treated via synchronous audio and visual telemedicine encounter.      Reason for Telemedicine Visit: Services only offered telehealth    Originating Site (Patient Location): Patient's home    Distant Site (Provider Location): Provider Remote Setting- Home Office    Consent:  The patient/guardian has verbally consented to: the potential risks and benefits of telemedicine (video visit) versus in person care; bill my insurance or make self-payment for services provided; and responsibility for payment of non-covered services.     Patient would like the video invitation sent by:  My Chart    Mode of Communication:  Video Conference via [employer]    Distant Location (Provider):  Off-site    As the provider I attest to compliance with applicable laws and regulations related to telemedicine.    UNIVERSAL ADULT Mental Health DIAGNOSTIC ASSESSMENT    Identifying Information:  Patient is a 27 year old,   individual.  Patient was referred for an assessment by primary care clinic.  Patient attended the session alone.    Chief Complaint:   The purpose of this evaluation is to: provide treatment recommendations and clarify diagnosis. Patient reported seeking services at this time for diagnostic assessment and recommendations for treatment.  Patient reported that she has not completed a previous ADHD diagnostic assessment.  Patient has  "received a previous diagnosis of ADHD in childhood . Patient reported that medication has been prescribed to address these problems.  Patient reported that medication was helpful and did not cause unpleasant side effects. Client was previously prescribed medication around age 7-8 until she was [age]. She stopped taking the medication \"because they said I was doing fine.\" She has a hard time focusing. She is easily distracted. Client can listen in conversations, but sometimes she has to ask people to repeat themselves. She is fidgety and restless and is bouncing her knee or leg up and down. She likes to be doing something with her hands. She puts a lot of effort into starting tasks, but she can procrastinate at times. Sometimes she has difficulty initiating tasks. She usually tries to finish a task before moving onto the next one. She is usually pretty organized, but sometimes \"I let things pile up.\" She usually knows where things are \"but things aren't where they should be.\"    Social/Family History:  Patient reported they grew up in  [place], [place].  They were raised by biological parents  .  Parents were always together.  She was the first born of two children. She has a younger brother. Patient reported that their childhood was \"okay.\" Client doesn't have many memories about childhood. They lived in a place where they didn't know many people and then they moved. Her parents were both busy with work. Her mother has always been busy (worked nights and slept during the day) and her father \"has not been emotionally available.\" After they moved, their social support network grew a bit. She then spent more time with her grandparents and \"got out more.\"  They met her basic needs. She felt they didn't pay attention to her and support her emotional needs. Patient described their current relationships with family of origin as \"pretty good.\" She talks to them often. They get along well. She moved to [place] about one year " "ago to be with her partner.    The patient describes their cultural background as .  Cultural influences and impact on patient's life structure, values, norms, and healthcare: LGBT, Neurodivergent.  Contextual influences on patient's health include: Contextual Factors: Individual Factors LGBTQ .  These factors will be addressed in the Preliminary Treatment plan. Patient identified their preferred language to be English. Patient reported they does not need the assistance of an  or other support involved in therapy.     Patient reported had no significant delays in developmental tasks.   Patient's highest education level was high school graduate  .  She is in college right now (just started this semester at [employer]). Patient identified the following learning problems: attention and concentration. She was diagnosed with ADHD at age [age]. Modifications will not be used to assist communication in therapy.  Patient reports they are able to understand written materials.    Patient reported the following relationship history:a few dating relationships (\"a couple; nothing crazy; I've had issues where I constantly felt abandoned in the past\"). Patient's current relationship status is has a partner or significant other for 2 years. They live together.  This is a supportive relationship. Patient identified their sexual orientation as pansexual.  Patient reported having 0 child(chris). Patient identified partner as part of their support system.  Patient identified the quality of these relationships as fair,  .      Patient's current living/housing situation involves staying in own home/apartment.  The immediate members of family and household include Silviano Mathisjaquan Ortega, Humza,Partner  and they report that housing is stable.     Patient is currently employed part time. She works as a PCA. She is also going to school. Patient reports their finances are obtained through employment. Patient does identify " "finances as a current stressor.      Patient reported that they have not been involved with the legal system.   Patient does not report being under probation/ parole/ jurisdiction.     Patient's Strengths and Limitations:  Patient identified the following strengths or resources that will help them succeed in treatment: commitment to health and well being, insight, intelligence, motivation, sense of humor, and strong social skills. Things that may interfere with the patient's success in treatment include: none identified.     Assessments:  The following assessments were completed by patient for this visit:  PHQ9:       9/15/2023     4:13 PM 9/18/2023    12:28 PM   PHQ-9 SCORE   PHQ-9 Total Score MyChart  8 (Mild depression)   PHQ-9 Total Score 17 8     GAD7:       9/15/2023     4:13 PM 9/18/2023     9:27 AM   TANG-7 SCORE   Total Score  13 (moderate anxiety)   Total Score 17 13    13     [place] Suicide Severity Rating Scale (Lifetime/Recent)      9/18/2023     1:14 PM   [place] Suicide Severity Rating (Lifetime/Recent)   Q1 Wish to be Dead (Lifetime) Y   Wish to be Dead Description (Lifetime) Client started having SI at age 15. Client last had SI about 5 years ago (around age 22). She noted her thoughts \"used to be more serious\" but they got to be more passive (\"not wanting to be around\"). Client attempted suicide via overdose at age 18 and was hospitalized.   1. Wish to be Dead (Past 1 Month) N   Q2 Non-Specific Active Suicidal Thoughts (Lifetime) Y   Non-Specific Active Suicidal Thought Description (Lifetime) Client started having SI at age 15. Client last had SI about 5 years ago (around age 22). She noted her thoughts \"used to be more serious\" but they got to be more passive (\"not wanting to be around\"). Client attempted suicide via overdose at age 18 and was hospitalized.   2. Non-Specific Active Suicidal Thoughts (Past 1 Month) N   3. Active Suicidal Ideation with any Methods (Not Plan) Without Intent to Act " "(Lifetime) Y   Q3 Active Suicidal Ideation with any Methods (Not Plan) Without Intent to Act (Past 1 Month) N   Q4 Active Suicidal Ideation with Some Intent to Act, Without Specific Plan (Lifetime) Y   Active Suicidal Ideation with Some Intent to Act, Without Specific Plan Description (Lifetime) Client started having SI at age 15. Client last had SI about 5 years ago (around age 22). She noted her thoughts \"used to be more serious\" but they got to be more passive (\"not wanting to be around\"). Client attempted suicide via overdose at age 18 and was hospitalized.   4. Active Suicidal Ideation with Some Intent to Act, Without Specific Plan (Past 1 Month) N   Q5 Active Suicidal Ideation with Specific Plan and Intent (Lifetime) Y   Active Suicidal Ideation with Specific Plan and Intent Description (Lifetime) Client started having SI at age 15. Client last had SI about 5 years ago (around age 22). She noted her thoughts \"used to be more serious\" but they got to be more passive (\"not wanting to be around\"). Client attempted suicide via overdose at age 18 and was hospitalized.   5. Active Suicidal Ideation with Specific Plan and Intent (Past 1 Month) N   Most Severe Ideation Rating (Lifetime) 5   Description of Most Severe Ideation (Lifetime) Client started having SI at age 15. Client last had SI about 5 years ago (around age 22). She noted her thoughts \"used to be more serious\" but they got to be more passive (\"not wanting to be around\"). Client attempted suicide via overdose at age 18 and was hospitalized.   Frequency (Lifetime) 3   Duration (Lifetime) 2   Controllability (Lifetime) 0   Deterrents (Lifetime) 0   Reasons for Ideation (Lifetime) 4   Actual Attempt (Lifetime) Y   Actual Attempt Description (Lifetime) Client started having SI at age 15. Client last had SI about 5 years ago (around age 22). She noted her thoughts \"used to be more serious\" but they got to be more passive (\"not wanting to be around\"). Client " "attempted suicide via overdose at age 18 and was hospitalized.   Actual Attempt (Past 3 Months) N   Has subject engaged in non-suicidal self-injurious behavior? (Lifetime) Y   Has subject engaged in non-suicidal self-injurious behavior? (Past 3 Months) N   Interrupted Attempts (Lifetime) N   Aborted or Self-Interrupted Attempt (Lifetime) N   Preparatory Acts or Behavior (Lifetime) N   Actual Lethality/Medical Damage Code (Most Recent Attempt) 0   Potential Lethality Code (Most Recent Attempt) 0   Actual Lethality/Medical Damage Code (Most Lethal Attempt) 3   Potential Lethality Code (Most Lethal Attempt) 0   Actual Lethality/Medical Damage Code (Initial/First Attempt) 0   Potential Lethality Code (Initial/First Attempt) 0   Calculated C-SSRS Risk Score (Lifetime/Recent) Moderate Risk           Client started having SI at age 15. Client last had SI about 5 years ago (around age 22). She noted her thoughts \"used to be more serious\" but they got to be more passive (\"not wanting to be around\"). Client attempted suicide via overdose at age 18 and was hospitalized. She has a safety plan. She has found DBT to be very helpful.     Answers submitted by the patient for this visit:  Patient Health Questionnaire (Submitted on 9/18/2023)  If you checked off any problems, how difficult have these problems made it for you to do your work, take care of things at home, or get along with other people?: Very difficult  PHQ9 TOTAL SCORE: 8  TANG-7 (Submitted on 9/18/2023)  TANG 7 TOTAL SCORE: 13      Personal and Family Medical History:  Patient does report a family history of mental health concerns.  Patient reports family history includes Depression in her maternal grandfather..     Patient does report Mental Health Diagnosis and/or Treatment.  Patient reported the following previous diagnoses which includes: an Anxiety Disorder and Depression. Client thinks she was also diagnosed with Borderline Personality Disorder in the past (this " "was established through psychiatry previously - she has done DBT in the past). She estimated this diagnosis was assigned when she was 21 years old (about 2017). Patient reported symptoms began in middle school. She had stopped the ADHD medications at that time, and she started struggling in school. Client didn't have a great social life, but she doesn't think this caused depression. Once she was off Adderall, she struggled with making friends a bit, but she has good social connections now.  Patient has received mental health services in the past:  psychiatry, medications from PCP, and counseling . Client has also done DBT. Client has been in therapy off and on since age [age].  Psychiatric Hospitalizations:  x3 . She attempted suicide at age 18 (she attempted suicide by overdose) and again at age 19 (\"having an argument with family and it escalated and the police got called; and they made me go to the hospital\"). She was inpatient for about a week and didn't go back to the hospital again. She attempted suicide as a teenager and was hospitalized each time after these incidents. Client engaged in self-harm (\"hitting myself out of frustration or anger\" around that time - but not since then).  Client has not had a hospitalization in the last 5 years.  Patient denies a history of civil commitment.  Patient is receiving other mental health services.  These include  medications from PCP and therapy .   Client is prescribed Wellbutrin, Lexapro, Ativan, and Zoloft by PCP.  Client is currently in therapy through [facility]. They have been working together for a few months.  This has been helpful. Client is stressed about school, work, taking care of stuff at home.     Patient has had a physical exam to rule out medical causes for current symptoms.  Date of last physical exam was within the past year. Client was encouraged to follow up with PCP if symptoms were to develop. The patient has a [facility] " Provider, who is named Laura Duncan.  Patient reports no current medical concerns.  Patient denies any issues with pain..   There are not significant appetite / nutritional concerns / weight changes. She lost some weight due to exercise recently.  Patient does not report a history of head injury / trauma / cognitive impairment.      Patient reports current meds as:   Outpatient Medications Marked as Taking for the 9/18/23 encounter (Virtual Visit) with Millie Acosta, PhD   Medication Sig    buPROPion (WELLBUTRIN XL) 150 MG 24 hr tablet Take 1 tablet (150 mg) by mouth every morning    escitalopram (LEXAPRO) 20 MG tablet Take 20 mg by mouth every morning    escitalopram (LEXAPRO) 5 MG tablet Take 3 tablets (15 mg) by mouth daily for 7 days, THEN 2 tablets (10 mg) daily for 7 days, THEN 1 tablet (5 mg) daily for 7 days.    LORazepam (ATIVAN) 0.5 MG tablet Take 1 tablet (0.5 mg) by mouth every 6 hours as needed for anxiety    sertraline (ZOLOFT) 25 MG tablet Take 1 tablet (25 mg) by mouth daily for 7 days, THEN 2 tablets (50 mg) daily for 7 days, THEN 3 tablets (75 mg) daily for 7 days, THEN 4 tablets (100 mg) daily for 14 days.    spironolactone (ALDACTONE) 100 MG tablet Take 1 tablet (100 mg) by mouth daily    tadalafil (CIALIS) 10 MG tablet Take 1 tablet (10 mg) by mouth daily as needed (sexual activity)       Medication Adherence:  Patient reports taking.  taking prescribed medications as prescribed.    Patient Allergies:  No Known Allergies    Medical History:    Past Medical History:   Diagnosis Date    TANG (generalized anxiety disorder)     Gender dysphoria in adult     MDD (major depressive disorder)          Current Mental Status Exam:   Appearance:  Appropriate    Eye Contact:  Good   Psychomotor:  Normal       Gait / station:  no problem  Attitude / Demeanor: Cooperative   Speech      Rate / Production: Normal/ Responsive      Volume:  Normal  volume      Language:  good and no obvious  problem  Mood:   Normal  Affect:   Appropriate    Thought Content: Clear   Thought Process: Goal Directed  Logical       Associations: No loosening of associations  Insight:   Good   Judgment:  Intact   Orientation:  All  Attention/concentration: Good    Substance Use:  Patient did not report a family history of substance use concerns; see medical history section for details.  Patient has not received chemical dependency treatment in the past.  Patient has not ever been to detox.      Patient is not currently receiving any chemical dependency treatment.           Substance History of use Age of first use Date of last use     Pattern and duration of use (include amounts and frequency)   Alcohol currently use   20 08/29/23 REPORTS SUBSTANCE USE: reports using substance 1 times per month and has 1 drink at a time.   Patient reports heaviest use is current use.   Cannabis   currently use 23 09/17/23; CBD drinks and gummies REPORTS SUBSTANCE USE: reports using substance 2-3 times per week and has 1 CBD drinks or gummies at a time.   Patient reports heaviest use is current use.     Amphetamines   never used     REPORTS SUBSTANCE USE: N/A   Cocaine/crack    never used       REPORTS SUBSTANCE USE: N/A   Hallucinogens never used         REPORTS SUBSTANCE USE: N/A   Inhalants never used         REPORTS SUBSTANCE USE: N/A   Heroin never used         REPORTS SUBSTANCE USE: N/A   Other Opiates never used     REPORTS SUBSTANCE USE: N/A   Benzodiazepine   never used     REPORTS SUBSTANCE USE: N/A   Barbiturates never used     REPORTS SUBSTANCE USE: N/A   Over the counter meds never used     REPORTS SUBSTANCE USE: N/A   Caffeine currently use 14   REPORTS SUBSTANCE USE: reports using substance 3-4 times per week and has 1 tea or coffee at a time.   Patient reports heaviest use is current use.   Nicotine  used in the past 18 06/01/23 REPORTS SUBSTANCE USE: N/A   Other substances not listed above:  Identify:  never used     REPORTS  "SUBSTANCE USE: N/A     Patient reported the following problems as a result of their substance use: no problems, not applicable.    Substance Use: No symptoms    Based on the negative CAGE score and clinical interview there  are not indications of drug or alcohol abuse.    Significant Losses / Trauma / Abuse / Neglect Issues:   Patient did not  serve in the .  There are indications or report of significant loss, trauma, abuse or neglect issues related to: Client stated, \"I probably experienced neglect in childhood; bullying in school, abandonment issues.\"  Concerns for possible neglect are not present.     Safety Assessment:   Patient denies current homicidal ideation and behaviors.  Patient denies current self-injurious ideation and behaviors.    Patient denied risk behaviors associated with substance use.  Patient denies any high risk behaviors associated with mental health symptoms.  Patient reports the following current concerns for their personal safety: None.  Patient reports there are not firearms in the house.         History of Safety Concerns:  Patient denied a history of homicidal ideation.     Patient denied a history of personal safety concerns.    Patient denied a history of assaultive behaviors.    Patient denied a history of sexual assault behaviors.     Patient denied a history of risk behaviors associated with substance use.  Patient denies any history of high risk behaviors associated with mental health symptoms.  Patient reports the following protective factors: dedication to family or friends; regular sleep; sense of belonging; purpose; help seeking behaviors when distressed; adherence with prescribed medication; agreement to use safety plan; positive social skills    Risk Plan:  See Recommendations for Safety and Risk Management Plan    Review of Symptoms per patient report:   Depression: Change in sleep, Lack of interest, Change in energy level, Difficulties concentrating, Change in " appetite, Psychomotor slowing or agitation, and Feeling sad, down, or depressed  Katie:  No Symptoms  Psychosis: No Symptoms - in the past, when she was inpatient they diagnosed her with psychosis because she was hallucinating (both auditory and visual; then became visual and then went away eventually) (took Seroquel for that for 5 years; recently stopped that and haven't had any issues since then)   Anxiety: Excessive worry, Nervousness, Sleep disturbance, Psychomotor agitation, Ruminations, Poor concentration, and Irritability  Panic:  Palpitations and Shortness of breath (due to stress and feeling overwhelmed)  Post Traumatic Stress Disorder:  Experienced traumatic event neglect in childhood; bullying; abandonment issues    Eating Disorder: No Symptoms  ADD / ADHD:  Inattentive, Difficulties listening, Poor task completion, Poor organizational skills, Distractibility, and Restlessness/fidgety  Conduct Disorder: No symptoms  Autism Spectrum Disorder: No symptoms  Obsessive Compulsive Disorder: No Symptoms    Patient reports the following compulsive behaviors and treatment history:  none reported .      Diagnostic Criteria:   Generalized Anxiety Disorder  A. Excessive anxiety and worry about a number of events or activities (such as work or school performance).   B. The person finds it difficult to control the worry.  C. Select 3 or more symptoms (required for diagnosis). Only one item is required in children.   - Restlessness or feeling keyed up or on edge.    - Being easily fatigued.    - Difficulty concentrating or mind going blank.    - Irritability.    - Muscle tension.    - Sleep disturbance (difficulty falling or staying asleep, or restless unsatisfying sleep).   D. The focus of the anxiety and worry is not confined to features of an Axis I disorder.  E. The anxiety, worry, or physical symptoms cause clinically significant distress or impairment in social, occupational, or other important areas of  functioning.   F. The disturbance is not due to the direct physiological effects of a substance (e.g., a drug of abuse, a medication) or a general medical condition (e.g., hyperthyroidism) and does not occur exclusively during a Mood Disorder, a Psychotic Disorder, or a Pervasive Developmental Disorder. Major Depressive Disorder  A) Recurrent episode(s) - symptoms have been present during the same 2-week period and represent a change from previous functioning 5 or more symptoms (required for diagnosis)   - Depressed mood. Note: In children and adolescents, can be irritable mood.     - Diminished interest or pleasure in all, or almost all, activities.    - Decreased sleep.    - Psychomotor activity agitation.    - Fatigue or loss of energy.    - Diminished ability to think or concentrate, or indecisiveness.     Functional Status:  Patient reports the following functional impairments:  academic performance, management of the household and or completion of tasks, relationship(s), self-care, and work / vocational responsibilities.         Clinical Summary:  1. Reason for assessment: ADHD Evaluation  .  2. Psychosocial, Cultural and Contextual Factors: issues at home/work; LGBTQ  .  3. Principal DSM5 Diagnoses  (Sustained by DSM5 Criteria Listed Above):   296.31 (F33.0) Major Depressive Disorder, Recurrent Episode, Mild _  300.02 (F41.1) Generalized Anxiety Disorder.  History of Borderline Personality Disorder  History of ADHD  6. Prognosis: Expect Improvement and Maintain Current Status / Prevent Deterioration.  7. Likely consequences of symptoms if not treated: issues at home/school.  8. Client strengths include:  caring, creative, educated, empathetic, employed, goal-focused, good listener, has a previous history of therapy, insightful, intelligent, motivated, open to learning, open to suggestions / feedback, wants to learn, willing to ask questions, willing to relate to others, and work history .     Recommendations:      1. Plan for Safety and Risk Management:   Safety and Risk: Recommended that patient call 911 or go to the local ED should there be a change in any of these risk factors..  Client has a safety plan with her therapist.        Report to child / adult protection services was NA.      4. Resources/Service Plan:    services are not indicated.   Modifications to assist communication are not indicated.   Additional disability accommodations are not indicated.      5. Collaboration:   Collaboration / coordination of treatment will be initiated with the following  support professionals: primary care physician and outpatient therapist.      6.  Referrals:   The following referral(s) will be initiated:  NA . Next Scheduled Appointment: NA.      A Release of Information has been obtained for the following:  TBD if TRISH is needed for therapist .    7. CEASAR:    CEASAR:  Discussed the general effects of drugs and alcohol on health and well-being. Provider gave patient printed information about the  effects of chemical use on their health and well being. Recommendations:  NA .     8. Records:   These were reviewed at time of assessment.   Information in this assessment was obtained from the medical record and  provided by patient who is a good historian.    Patient will have open access to their mental health medical record.    9.   Interactive Complexity: No    10. Safety Plan:  Patient denied any current/recent/lifetime history of suicidal ideation and/or behaviors.  No safety plan indicated at this time.     Provider Name/ Credentials:  Millie Acosta, PhD, LP  September 18, 2023

## 2023-09-19 LAB — HCV AB SERPL QL IA: NONREACTIVE

## 2023-09-21 NOTE — PROGRESS NOTES
Preceptor Attestation:   Patient seen, evaluated and discussed with the resident. I have verified the content of the note, which accurately reflects my assessment of the patient and the plan of care.   Supervising Physician:  Joleen Guo, DO

## 2023-09-25 ENCOUNTER — OFFICE VISIT (OUTPATIENT)
Dept: URGENT CARE | Facility: URGENT CARE | Age: 28
End: 2023-09-25
Payer: COMMERCIAL

## 2023-09-25 VITALS
SYSTOLIC BLOOD PRESSURE: 103 MMHG | BODY MASS INDEX: 36.29 KG/M2 | HEART RATE: 91 BPM | OXYGEN SATURATION: 98 % | TEMPERATURE: 98.2 F | HEIGHT: 69 IN | DIASTOLIC BLOOD PRESSURE: 83 MMHG | WEIGHT: 245 LBS

## 2023-09-25 DIAGNOSIS — R07.0 THROAT PAIN: Primary | ICD-10-CM

## 2023-09-25 LAB — DEPRECATED S PYO AG THROAT QL EIA: NEGATIVE

## 2023-09-25 PROCEDURE — 99213 OFFICE O/P EST LOW 20 MIN: CPT | Performed by: PHYSICIAN ASSISTANT

## 2023-09-25 PROCEDURE — 87651 STREP A DNA AMP PROBE: CPT | Performed by: PHYSICIAN ASSISTANT

## 2023-09-25 RX ORDER — AMOXICILLIN 500 MG/1
500 CAPSULE ORAL 2 TIMES DAILY
Qty: 14 CAPSULE | Refills: 0 | Status: CANCELLED | OUTPATIENT
Start: 2023-09-25

## 2023-09-25 ASSESSMENT — ENCOUNTER SYMPTOMS
VOMITING: 0
CONSTIPATION: 0
CHILLS: 1
DIARRHEA: 0
SORE THROAT: 1
SHORTNESS OF BREATH: 0
MYALGIAS: 0
NAUSEA: 0
COUGH: 1
HEADACHES: 1

## 2023-09-25 NOTE — LETTER
September 25, 2023      Sada Paredes  [address] [place] [address]        To Whom It May Concern:    Sada Paredes was seen in our clinic. She may return to school without restrictions.  Patient missed 9/18-9/26.      Sincerely,        REUBEN Stroud

## 2023-09-26 LAB — GROUP A STREP BY PCR: NOT DETECTED

## 2023-09-26 NOTE — PROGRESS NOTES
SUBJECTIVE:   Sada Paredes is a 27 year old adult presenting with a chief complaint of   Chief Complaint   Patient presents with    Urgent Care    Nasal Congestion    Throat Pain    Fever     1 week ago- dry cough, sore throat, congestion, tactile temperature  Tried: Nyquil, tessalon not much relief, Tylenol  Covid tests negative    She is an established patient of [facility].      Review of Systems   Constitutional:  Positive for chills.   HENT:  Positive for congestion and sore throat.    Respiratory:  Positive for cough. Negative for shortness of breath.    Cardiovascular:  Negative for chest pain.   Gastrointestinal:  Negative for constipation, diarrhea, nausea and vomiting.   Musculoskeletal:  Negative for myalgias.   Skin:  Negative for rash.   Neurological:  Positive for headaches.       Past Medical History:   Diagnosis Date    TANG (generalized anxiety disorder)     Gender dysphoria in adult     MDD (major depressive disorder)      Family History   Problem Relation Age of Onset    Depression Maternal Grandfather      Current Outpatient Medications   Medication Sig Dispense Refill    amoxicillin-clavulanate (AUGMENTIN) 875-125 MG tablet Take 1 tablet by mouth 2 times daily 14 tablet 0    buPROPion (WELLBUTRIN XL) 150 MG 24 hr tablet Take 1 tablet (150 mg) by mouth every morning 90 tablet 3    escitalopram (LEXAPRO) 20 MG tablet Take 20 mg by mouth every morning      escitalopram (LEXAPRO) 5 MG tablet Take 3 tablets (15 mg) by mouth daily for 7 days, THEN 2 tablets (10 mg) daily for 7 days, THEN 1 tablet (5 mg) daily for 7 days. 42 tablet 0    LORazepam (ATIVAN) 0.5 MG tablet Take 1 tablet (0.5 mg) by mouth every 6 hours as needed for anxiety 10 tablet 0    sertraline (ZOLOFT) 25 MG tablet Take 1 tablet (25 mg) by mouth daily for 7 days, THEN 2 tablets (50 mg) daily for 7 days, THEN 3 tablets (75 mg) daily for 7 days, THEN 4 tablets (100 mg) daily for 14 days. 98 tablet 0    spironolactone (ALDACTONE) 100  "MG tablet Take 1 tablet (100 mg) by mouth daily 90 tablet 1    estradiol (ESTRACE) 2 MG tablet Take 3 tablets (6 mg) by mouth daily for 90 days 270 tablet 0    tadalafil (CIALIS) 10 MG tablet Take 1 tablet (10 mg) by mouth daily as needed (sexual activity) (Patient not taking: Reported on 9/25/2023) 10 tablet 0     Social History     Tobacco Use    Smoking status: Never     Passive exposure: Never    Smokeless tobacco: Never   Substance Use Topics    Alcohol use: Not on file       OBJECTIVE  /83   Pulse 91   Temp 98.2  F (36.8  C) (Temporal)   Ht 1.753 m (5' 9\")   Wt 111.1 kg (245 lb)   SpO2 98%   BMI 36.18 kg/m      Physical Exam  Vitals and nursing note reviewed.   Constitutional:       General: She is not in acute distress.     Appearance: Normal appearance. She is obese. She is not ill-appearing, toxic-appearing or diaphoretic.   HENT:      Head: Normocephalic and atraumatic.      Right Ear: Tympanic membrane and external ear normal.      Left Ear: Tympanic membrane and external ear normal.      Ears:      Comments: Moderate cerumen bilaterally     Mouth/Throat:      Mouth: Mucous membranes are moist.      Pharynx: Oropharynx is clear. No oropharyngeal exudate or posterior oropharyngeal erythema.   Eyes:      Conjunctiva/sclera: Conjunctivae normal.   Cardiovascular:      Rate and Rhythm: Normal rate and regular rhythm.      Heart sounds: Normal heart sounds.   Pulmonary:      Effort: Pulmonary effort is normal.      Breath sounds: Normal breath sounds.   Musculoskeletal:      Cervical back: Normal range of motion.   Skin:     General: Skin is warm and dry.   Neurological:      General: No focal deficit present.      Mental Status: She is alert and oriented to person, place, and time. Mental status is at baseline.   Psychiatric:         Mood and Affect: Mood normal.         Behavior: Behavior normal.         Thought Content: Thought content normal.         Judgment: Judgment normal. "         Labs:  Results for orders placed or performed in visit on 09/25/23 (from the past 24 hour(s))   Streptococcus A Rapid Screen w/Reflex to PCR - Clinic Collect    Specimen: Throat; Swab   Result Value Ref Range    Group A Strep antigen Negative Negative       X-Ray was not done.    ASSESSMENT:      ICD-10-CM    1. Throat pain  R07.0 Streptococcus A Rapid Screen w/Reflex to PCR - Clinic Collect     Group A Streptococcus PCR Throat Swab     amoxicillin-clavulanate (AUGMENTIN) 875-125 MG tablet           Medical Decision Making:    Differential Diagnosis:  URI Adult/Peds:  Allergic rhinitis, Bronchitis-viral, Pneumonia, Sinusitis, Strep pharyngitis, Viral pharyngitis, Viral syndrome, and Viral upper respiratory illness    Serious Comorbid Conditions:  Adult:  None    PLAN:    Afebrile, vitals stable. Sinusitis vs viral etiology most likely. Prescribed augmentin and told patient to wait one week to fill prescription if no improvement of symptoms. Otherwise recommended to continue Tessalon and over the counter cold medication. Recommended hydration. Discussed red flags and encouraged to seek medical attention if experiencing these    Followup:    If not improving or if condition worsens, follow up with your Primary Care Provider, If not improving or if conditions worsens over the next 12-24 hours, go to the Emergency Department    There are no Patient Instructions on file for this visit.

## 2023-10-22 ENCOUNTER — HEALTH MAINTENANCE LETTER (OUTPATIENT)
Age: 28
End: 2023-10-22

## 2023-10-27 ENCOUNTER — VIRTUAL VISIT (OUTPATIENT)
Dept: PSYCHOLOGY | Facility: CLINIC | Age: 28
End: 2023-10-27
Payer: COMMERCIAL

## 2023-10-27 ENCOUNTER — DOCUMENTATION ONLY (OUTPATIENT)
Dept: PSYCHOLOGY | Facility: CLINIC | Age: 28
End: 2023-10-27
Payer: COMMERCIAL

## 2023-10-27 DIAGNOSIS — F64.9 GENDER DYSPHORIA: ICD-10-CM

## 2023-10-27 DIAGNOSIS — F41.1 GAD (GENERALIZED ANXIETY DISORDER): Primary | ICD-10-CM

## 2023-10-27 DIAGNOSIS — F33.0 MAJOR DEPRESSIVE DISORDER, RECURRENT EPISODE, MILD (H): ICD-10-CM

## 2023-10-27 PROCEDURE — 90832 PSYTX W PT 30 MINUTES: CPT | Mod: 95 | Performed by: PSYCHOLOGIST

## 2023-10-27 NOTE — PROGRESS NOTES
Name: Jennifer Paredes   MRN: [id number]  : 1995     Client completed the [place] Multiphasic Personality Inventory-2 (MMPI-2), a self-report personality inventory, as part of her evaluation. Validity scales indicate that the client responded in a manner that suggested overreporting or exaggeration. There were elevations on most scales which makes it difficult to interpret in a meaningful way. The profiles is invalid and uninterpretable.

## 2023-10-27 NOTE — PROGRESS NOTES
Progress Note       Client Name:               Jennifer Paredes          Date:   10/27/2023      Service Type:             Individual      Telemedicine Visit: The patient's condition can be safely assessed and treated via synchronous audio and visual telemedicine encounter.        Reason for Telemedicine Visit: Services only offered telehealth      Originating Site (Patient Location): Patient's home            Distant Location (provider location):? Off-site      Consent:  The patient/guardian has verbally consented to: the potential risks and benefits of telemedicine (video visit) versus in person care; bill my insurance or make self-payment for services provided; and responsibility for payment of non-covered services.       Mode of Communication:? Video Conference via [employer]      As the provider I attest to compliance with applicable laws and regulations related to telemedicine.        Session Start Time:              9:00                          Session End Time: 9:21      Session Length:      21 minutes      Session #:     2       Attendees:     Client attended alone      Intervention: reviewed strategies for managing depression and anxiety; motivational interviewing: explored potential barriers for making healthy changes      Identifying Information:   Client is a 27 year old, , partnered / significant other individual. Client was referred for a diagnostic assessment by PCP.  The purpose of this evaluation is to: provide treatment recommendations and clarify diagnosis.  Client is currently  employed part time and reports she is able to function appropriately at work. Client attended the session alone.          Client's Statement of Presenting Concern:   Client reported seeking services at this time for diagnostic assessment and recommendations for treatment.  Client's presenting concerns include:  She has a hard time focusing. She is easily distracted. Client can listen in conversations, but  "sometimes she has to ask people to repeat themselves. She is fidgety and restless and is bouncing her knee or leg up and down. She likes to be doing something with her hands. She puts a lot of effort into starting tasks, but she can procrastinate at times. Sometimes she has difficulty initiating tasks. She usually tries to finish a task before moving onto the next one. She is usually pretty organized, but sometimes \"I let things pile up.\" She usually knows where things are \"but things aren't where they should be.\" Sometimes she spends money she shouldn't be spending. Client is forgetful and relies on lists and calendars to stay on top of tasks. Without reminders and alarms on her phone, she will forget to do things (e.g., she sets 5 alarms for a doctor appointment). She interrupts people and cuts them off at times. Client can relax and watch [employer] videos or play a game, but it is harder to watch movies and read books (\"I have to already be in a relaxed state\"). Client stated that symptoms have resulted in the following functional impairments: academic performance, management of the household and or completion of tasks, relationship(s), self-care, and work / vocational responsibilities.    .           History of Presenting Concern:   Patient has received a previous diagnosis of ADHD in childhood . Patient reported that medication has been prescribed to address these problems.  Patient reported that medication was helpful and did not cause unpleasant side effects. Client was previously prescribed medication around age 7-8 until she was 14/15. She stopped taking the medication \"because they said I was doing fine.\" Client reported that these problem(s) began in childhood. Client has attempted to resolve these concerns in the past through medications. Client reported that other professionals are involved in providing support / services. These include  medications from PCP and therapy. Client is prescribed Wellbutrin, " "Lexapro, Ativan, and Zoloft by PCP.  Client is currently in therapy through [facility]. They have been working together for a few months.  This has been helpful.         Social History:   As a child, client reported that she failed to complete assigned chores in the home environment, had problems getting ready for school in the morning, had problems with organization and keeping track of items, misplaced or lost things, forgot school work or other items between home and school, needed frequent reminders by parents to be motivated or to complete work, displayed argumentative or oppositional behaviors, and had problems managing temper with frequent emotional outbursts. Client reported difficulty with childhood peer relationships. Client felt teased/bullied growing up. She tried to get along with other kids \"but they did not get along well with me.\"  As a child, client reported having sleep disturbance, including: insomnia . She had some difficulty falling asleep. This became more of an issue as she got older and would go to bed later. Client reported currently experiencing sleep disturbance, including: insomnia.  Sometimes it is hard to fall asleep; her mind is busy sometimes. Client reported sleeping approximately 6-8 hours per night.  Client reported that she has not completed a sleep study.  Client reported having a well balanced diet.  There are not significant nutritional concerns.  Client reported sporadic exercise patterns.         Client's highest education level was high school graduate and some college. Client graduated high school in 2017. Client did well in middle school. She estimated she obtained mostly Bs and Cs. In high school she obtained mostly Cs and Ds. She thinks that this was due to the fact that she stopped taking ADHD medications and she was less focused on academics because she was distracted by work and social life. She stated, \"My focus wasn't just on school work.\" During the " "elementary, middle, and high school years, patient recalls academic strengths in the area of social studies and literature. It could be challenging to read books but she liked writing. Client reported experiencing academic problems in math and science. Client did identify the following learning problems: attention and concentration. Client did receive tutoring services during the school years. She had after school classes that helped kids with homework. Client did not receive special education services. Client reported significant behavior and discipline problems including: disruptive classroom behavior and failure to finish or complete homework. Client would procrastinate and intend to start homework later but then not complete it. Sometimes she forgot there was an assignment to do and she wouldn't do it. She would rush at the last minute to get homework done the morning that it was due. Sometimes she turned assignments in that were partially completed. She could be talkative in class in middle school (\"sometimes there were outbursts and I would talk when I wasn't supposed to\"). She had difficulty paying attention and barely ever took notes. When she struggled with depression later in high school, it was hard to get out of bed and she missed some school. Client did attend post-secondary school. She started this fall at [employer]. Client reported she is taking a break from school this semester \"because things got chaotic and I had to focus on work and home life.\"       Client reported that she is currently employed part-time. She works as a PCA. She has been at her current job for one year. Client reported that the current job is a good fit for her skills and personality.  Client reported that she distractible behavior . She has anxiety about showing up late, so she is very punctual. Sometimes she has made a few mistake \"here and there\" but it is pointed out to her and she corrects her mistakes. Sometimes she " "can feel bored. She doesn't feel that there is time allowed for her to sit down and relax at all; it is very busy and if there is down time, they are expected to do more work. The job is quite busy and demanding. Client feels she can be distracted sometimes, and other days she can be on task and getting things done. 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  Client has not been terminated from a place of employment.             Risk Taking Behaviors:   Client reported a history of the following risk taking behaviors: excessive spending -sometimes buying things she shouldn't buy        Motor Vehicle Operation:   Client has not received a 's license. She is working on getting a [place] 's license.  She did 's training when she was 18 years old, but didn't follow through on getting the required hours or taking the test.        Mental Status Assessment:   Appearance:                                 Appropriate    Eye Contact:                                 Good    Psychomotor Behavior:        Normal    Attitude:                                            Cooperative    Orientation:                                    All   Speech   Rate / Production:     Normal    Volume:                            Normal    Mood:                                                 Normal   Affect:                                                 Appropriate    Thought Content:                      Clear    Thought Form:                             Coherent  Logical    Insight:                                                              Good          Review of Symptoms:   Depression:     Change in sleep, Lack of interest, Change in energy level, Difficulties " "concentrating, Change in appetite, Psychomotor slowing or agitation, and Feeling sad, down, or depressed  Katie:             No Symptoms  Psychosis:       No Symptoms - in the past, when she was inpatient they diagnosed her with psychosis because she was hallucinating (both auditory and visual; then became visual and then went away eventually) (took Seroquel for that for 5 years; recently stopped that and haven't had any issues since then)   Anxiety:           Excessive worry, Nervousness, Sleep disturbance, Psychomotor agitation, Ruminations, Poor concentration, and Irritability  Panic:              Palpitations and Shortness of breath (due to stress and feeling overwhelmed)  Post Traumatic Stress Disorder:  Experienced traumatic event neglect in childhood; bullying; abandonment issues    Eating Disorder:          No Symptoms  ADD / ADHD:              Inattentive, Difficulties listening, Poor task completion, Poor organizational skills, Distractibility, and Restlessness/fidgety  Conduct Disorder:       No symptoms  Autism Spectrum Disorder:     No symptoms  Obsessive Compulsive Disorder:       No Symptoms  Reckless Behavior:  Excessive Spending            Safety Issues and Plan for Safety and Risk Management:   Client has had a history of Client started having SI at age 15. Client last had SI about 5 years ago (around age 22). She noted her thoughts \"used to be more serious\" but they got to be more passive (\"not wanting to be around\"). Client attempted suicide via overdose at age 18 and was hospitalized. She has a safety plan. She has found DBT to be very helpful.        Client denies current fears or concerns for personal safety.   Client denies current or recent suicidal ideation or behaviors.   Client denies current or recent homicidal ideation or behaviors.   Client denies current or recent self injurious behavior or ideation.   Client denies other safety concerns.   Client reports there are no firearms in the " house.   Recommended that patient call 911 or go to the local ED should there be a change in any of these risk factors.            Diagnostic Criteria:   Attention Deficit Hyperactivity Disorder  A) A persistent pattern of inattention and/or hyperactivity-impulsivity that interferes with functioning or development, as characterized by (1) Inattention and/or (2) Hyperactivity and Impulsivity  (1) Inattention: 6 or more of the following symptoms have persisted for at least 6 months to a degree that is inconsistent with developmental level and that negatively impacts directly on social and academic/occupational activities:  - Often has difficulty sustaining attention in tasks or play activities  - Often does not seem to listen when spoken to directly  - Often does not follow through on instructions and fails to finish schoolwork, chores, or duties in the workplace  - Often avoids, dislikes, or is reluctant to engage in tasks that require sustained mental effort  - Is often easily distractedby extraneous stimuli  (2) Hyeractivity and Impulsivity: 6 or more of the following symptoms have persisted for at least 6 months to a degree that is inconsistent with developmental level and that negatively impacts directly on social and academic/occupational activities:  - Often fidgets with or taps hands or feet or squirms in seat  - Often talks excessively  - Often has difficulty waiting his or her turn  - Often interrupts or intrudes on others       Generalized Anxiety Disorder  A. Excessive anxiety and worry about a number of events or activities (such as work or school performance).   B. The person finds it difficult to control the worry.  C. Select 3 or more symptoms (required for diagnosis). Only one item is required in children.   - Restlessness or feeling keyed up or on edge.    - Being easily fatigued.    - Difficulty concentrating or mind going blank.    - Irritability.    - Muscle tension.    - Sleep disturbance  (difficulty falling or staying asleep, or restless unsatisfying sleep).   D. The focus of the anxiety and worry is not confined to features of an Axis I disorder.  E. The anxiety, worry, or physical symptoms cause clinically significant distress or impairment in social, occupational, or other important areas of functioning.   F. The disturbance is not due to the direct physiological effects of a substance (e.g., a drug of abuse, a medication) or a general medical condition (e.g., hyperthyroidism) and does not occur exclusively during a Mood Disorder, a Psychotic Disorder, or a Pervasive Developmental Disorder.     Major Depressive Disorder  A) Recurrent episode(s) - symptoms have been present during the same 2-week period and represent a change from previous functioning 5 or more symptoms (required for diagnosis)   - Depressed mood. Note: In children and adolescents, can be irritable mood.     - Diminished interest or pleasure in all, or almost all, activities.    - Decreased sleep.    - Psychomotor activity agitation.    - Fatigue or loss of energy.    - Diminished ability to think or concentrate, or indecisiveness.        Functional Status:   Client's symptoms have caused reduced functional status in the following areas:  academic performance, management of the household and or completion of tasks, relationship(s), self-care, and work / vocational responsibilities.             DSM-5Diagnoses: (Sustained by DSM5 Criteria Listed Above)     296.31 (F33.0) Major Depressive Disorder, Recurrent Episode, Mild   300.02 (F41.1) Generalized Anxiety Disorder.  History of Borderline Personality Disorder  History of ADHD    Attendance Agreement:   Client has signed Attendance Agreement:No: unable to sign via telehealth         Preliminary Plan:   The client reports no currently identified Episcopalian, ethnic or cultural issues relevant to therapy.       services are not indicated.      Modifications to assist  communication are not indicated.      Collaboration / coordination of treatment will be initiated with the following support professionals: primary care physician and outpatient therapist.      Referral to another professional/service is not indicated at this time.      A Release of Information has been obtained for the following: TBD if TRISH for therapist is warranted.      Client was given self and collaborative rating scales to be completed prior to the next appointment.  Client consented to sending/receiving these measures via email.  Depression and anxiety rating scales were completed.  A third appointment was not scheduled at this time.       Report to child / adult protection services was NA.      Patient will have open access to their mental health medical record.      Millie Acosta, PhD, LP                         October 27, 2023          Answers submitted by the patient for this visit:  Patient Health Questionnaire (Submitted on 10/27/2023)  If you checked off any problems, how difficult have these problems made it for you to do your work, take care of things at home, or get along with other people?: Very difficult  PHQ9 TOTAL SCORE: 8

## 2023-11-01 DIAGNOSIS — F41.1 GAD (GENERALIZED ANXIETY DISORDER): ICD-10-CM

## 2023-11-01 DIAGNOSIS — F32.5 MAJOR DEPRESSIVE DISORDER IN FULL REMISSION, UNSPECIFIED WHETHER RECURRENT (H): ICD-10-CM

## 2023-11-01 RX ORDER — SERTRALINE HYDROCHLORIDE 100 MG/1
100 TABLET, FILM COATED ORAL DAILY
Qty: 30 TABLET | Refills: 2 | Status: SHIPPED | OUTPATIENT
Start: 2023-11-01 | End: 2024-09-30

## 2023-11-01 NOTE — TELEPHONE ENCOUNTER
"Request for medication refill:  sertraline (ZOLOFT) 25 MG tablet     Providers if patient needs an appointment and you are willing to give a one month supply please refill for one month and  send a letter/MyChart using \".SMILLIMITEDREFILL\" .smillimited and route chart to \"[facility] \" (Giving one month refill in non controlled medications is strongly recommended before denial)    If refill has been denied, meaning absolutely no refills without visit, please complete the smart phrase \".smirxrefuse\" and route it to the \"[facility] MED REFILLS\"  pool to inform the patient and the pharmacy.    Micky Chaparro, [facility]      "

## 2023-11-14 ENCOUNTER — DOCUMENTATION ONLY (OUTPATIENT)
Dept: PSYCHOLOGY | Facility: CLINIC | Age: 28
End: 2023-11-14
Payer: COMMERCIAL

## 2023-11-14 NOTE — PROGRESS NOTES
"Client Name:  Jennifer Paredes  MRN: [id number]  : 1995    Jennifer Adult ADHD Rating Scale-IV: Self and Other Reports (BAARS-IV)   The BAARS-IV assesses for symptoms of ADHD that are experienced in one's daily life. This assessment measure includes self and collateral rating scales designed to provide information regarding current and childhood symptoms of ADHD including inattention, hyperactivity, and impulsivity. Self-report scores are reported as percentiles. Scores at the 76th-83rd percentile are considered marginal, scores at the 84th-92nd percentile are considered borderline, scores at the 93rd-95th percentile are considered mild, scores at the 96th-98th percentile are considered moderate, and those at the 99th percentile are considered severe. Collateral or \"other\" rating scales are reported as number of symptoms observed in comparison to those reported by the client. Norms and percentile scores are not available for collateral reports.    ?   Current Symptoms Scale--Self Report:    Client completed the self-report inventory of current symptoms. The results indicate that the client's Total ADHD Score was 59 which places them in the 99th percentile for overall ADHD symptoms. In addition, the client endorsed the following occur \"often\" or \"very often\": 8/9 (98th percentile) Inattention symptoms, 7/9 (98th?percentile) Hyperactivity/Impulsivity symptoms, and 7/9 (97th percentile) Sluggish Cognitive Tempo symptoms. Overall, the results suggest the client is reporting moderate symptoms of inattention and moderate symptoms of hyperactivity/impulsivity at this time.    ?   Current Symptoms Scale--Other Report:   Client's fiance completed the collateral report inventory of current symptoms. Based on the collateral contact's observation of symptoms, the client demonstrates the following \"often\" or \"very often\": 7/9 Inattention symptoms, 2/5 Hyperactivity symptoms, 4/4 Impulsivity symptoms, and 7/9 Sluggish " "Cognitive Tempo symptoms. The client's Total ADHD Score was 56. The collateral- and self-report scores are similar and suggest Client experiences symptoms of inattention and symptoms of hyperactivity/impulsivity at this time.     Childhood Symptoms Scale--Self-Report:   Client completed the self-report inventory of childhood symptoms. The results indicate that the client's Total ADHD Score was 58 which places them in the 98th percentile for overall ADHD symptoms in childhood. In addition, the client endorsed having experienced the following \"often\" or \"very often\": 8/9 (98th percentile) Inattention symptoms and 7/9 (97th percentile) Hyperactivity-Impulsivity symptoms. Overall, the results suggest the client experienced moderate symptoms of inattention and moderate symptoms of hyperactivity/impulsivity in childhood.       Childhood Symptoms Scale--Other Report:   Client's fiance completed the collateral report inventory of childhood symptoms. Based on the collateral contact's recollection of client's childhood symptoms, the client demonstrated the following \"often\" or \"very often\": 7/9 Inattention symptoms and 8/9 Hyperactivity-Impulsivity symptoms. The client's Total ADHD Score was 57. The collateral-report and self-report scores are similar and suggest symptoms of hyperactivity/impulsivity in childhood.  ?   Jennifer Functional Impairment Scale: Self and Other Reports (BFIS)   The BFIS is used to assess an individuals' psychosocial impairment in major life/daily activities that may be due to a mental health disorder. This assessment measure includes self and collateral rating scales. Self-report scores are reported as percentiles. Scores at the 76th-83rd percentile are considered marginal, scores at the 84th-92nd percentile are considered borderline, scores at the 93rd-95th percentile are considered mild, scores at the 96th-98th percentile are considered moderate, and those at the 99th percentile are considered " "severe. Collateral or \"other\" rating scales are reported as number of symptoms observed in comparison to those reported by the client. Norms and percentile scores are not available for collateral reports.    ?   Results indicate the client identified impairment (scores at or greater than 93rd percentile) in the following areas: home-chores, work, social-strangers, social-friends, education, dating/marriage, money management, self-care routines and health maintenance. The client's Mean Impairment Score was 7.75 (99th percentile) indicating the client is reporting severe impairment in functioning across domains. Client's ye completed the collateral rating scale, which indicated discrepant results (e.g., Mean Impairment Score of 8.07). He noted impairment in the areas of: home-family, home-chores, work, social-strangers, social-friends, community activities, education, dating/marriage, money management, daily responsibilities, self-care routines and health maintenance.        Jennifer Deficits in Executive Functioning Scale (BDEFS)   The BDEFS is a measure used for evaluating dimensions of adult executive functioning in daily life. This assessment measure includes self and collateral rating scales. Self-report scores are reported as percentiles. Scores at the 76th-83rd percentile are considered marginal, scores at the 84th-92nd percentile are considered borderline, scores at the 93rd-95th percentile are considered mild, scores at the 96th-98th percentile are considered moderate, and those at the 99th percentile are considered severe. Collateral or \"other\" rating scales are reported as number of symptoms observed in comparison to those reported by the client. Norms and percentile scores are not available for collateral reports.    ?   Results indicate the client's Total Executive Functioning Score was 279 (99th percentile). The ADHD-Executive Functioning Index score was 32 (98th percentile). These scores suggest the " client is reporting moderate to severe deficits in executive functioning. These deficits may be due to ADHD or other mental health conditions. They noted the following deficits: self-management to time (severe); self-organization/problem-solving (severe); self-restraint (moderate); self-motivation (moderate); and self-regulation of emotions (moderate). Client's evette completed the collateral report which indicated discrepant results. They noted deficits in the areas of: self-management to time, self-organization/problem-solving, self-restraint and self-regulation of emotions.     Generalized Anxiety Disorder Questionnaire (TANG-7)   This questionnaire is designed to screen for anxiety in adults. Based on the client's score of 10, they are reporting moderate symptoms of anxiety at this time. Client endorsed the following symptoms of anxiety: feeling nervous/anxious/on edge; not being able to stop or control worrying; worrying too much about different things; trouble relaxing; restlessness; becoming easily annoyed or irritable and feeling afraid as if something awful might happen.  ?   Patient Health Questionnaire- 9 (PHQ-9)    This questionnaire is designed to screen for depression in adults. Based on the client's score of 10, they are reporting moderate symptoms of depression at this time. Client endorsed the following symptoms of depression: little interest or pleasure in doing things; feeling down/depressed/hopeless; trouble falling asleep/staying asleep/sleeping too much; feeling tired or having little energy; poor appetite or overeating; poor concentration and feeling fidgety/restless.

## 2023-11-15 ENCOUNTER — DOCUMENTATION ONLY (OUTPATIENT)
Dept: PSYCHOLOGY | Facility: CLINIC | Age: 28
End: 2023-11-15
Payer: COMMERCIAL

## 2023-11-15 NOTE — PROGRESS NOTES
[facility]   ADHD Evaluation      Patient: Jennifer Paredes  YOB: 1995  MRN: [id number]     Date(s) of assessment: Diagnostic Assessment (9/18/23, 10/27/23); MMPI-2 (Administered 10/27/23; Interpreted on 10/27/23); Jennifer self-report and collateral measures scored and interpreted (11/14/23)    Information about appointment:   Client attended two sessions to aid in determining client's mental health diagnosis or diagnoses and treatment recommendations that best address client concerns. Available medical records were reviewed. There were no previous psychological evaluations for review. A diagnostic assessment was conducted at the initial appointment. Client completed several rating scales to assist in assessing attention-related and other mental health symptoms that may be causing impairments in functioning. Rating scales were also completed by a collateral contact. Personality testing was also completed to aid in diagnostic clarification.   ?   Assessment tools:    Jennifer Adult ADHD Rating Scale-IV: Self and Other Reports (BAARS-IV), Jennifer Functional Impairment Scale: Self and Other Reports (BFIS), Jennifer Deficits in Executive Functioning Scale: Self and Other Reports (BDEFS), Patient Health Questionnaire-9 (PHQ-9), and Generalized Anxiety Disorder-7 (TANG-7); [place] Multiphasic Personality Inventory-Second Edition (MMPI-2); *Testing administered remotely    ?   Assessment Results:   Behavioral Observations:   Client arrived to each session on-time. She was pleasant and cooperative at all times. Client did not demonstrate significant difficulties with inattention or hyperactivity/impulsivity during the sessions. The following results are likely to be an accurate reflection of Client's current functioning.      Jennifer Adult ADHD Rating Scale-IV: Self and Other Reports (BAARS-IV)   The BAARS-IV assesses for symptoms of ADHD that are experienced in one's daily life. This assessment  "measure includes self and collateral rating scales designed to provide information regarding current and childhood symptoms of ADHD including inattention, hyperactivity, and impulsivity. Self-report scores are reported as percentiles. Scores at the 76th-83rd percentile are considered marginal, scores at the 84th-92nd percentile are considered borderline, scores at the 93rd-95th percentile are considered mild, scores at the 96th-98th percentile are considered moderate, and those at the 99th percentile are considered severe. Collateral or \"other\" rating scales are reported as number of symptoms observed in comparison to those reported by the client. Norms and percentile scores are not available for collateral reports.    ?   Current Symptoms Scale--Self Report:    Client completed the self-report inventory of current symptoms. The results indicate that the client's Total ADHD Score was 59 which places them in the 99th percentile for overall ADHD symptoms. In addition, the client endorsed the following occur \"often\" or \"very often\": 8/9 (98th percentile) Inattention symptoms, 7/9 (98th?percentile) Hyperactivity/Impulsivity symptoms, and 7/9 (97th percentile) Sluggish Cognitive Tempo symptoms. Overall, the results suggest the client is reporting moderate symptoms of inattention and moderate symptoms of hyperactivity/impulsivity at this time.    ?   Current Symptoms Scale--Other Report:   Client's fiance completed the collateral report inventory of current symptoms. Based on the collateral contact's observation of symptoms, the client demonstrates the following \"often\" or \"very often\": 7/9 Inattention symptoms, 2/5 Hyperactivity symptoms, 4/4 Impulsivity symptoms, and 7/9 Sluggish Cognitive Tempo symptoms. The client's Total ADHD Score was 56. The collateral- and self-report scores are similar and suggest Client experiences symptoms of inattention and symptoms of hyperactivity/impulsivity at this time.     Childhood " "Symptoms Scale--Self-Report:   Client completed the self-report inventory of childhood symptoms. The results indicate that the client's Total ADHD Score was 58 which places them in the 98th percentile for overall ADHD symptoms in childhood. In addition, the client endorsed having experienced the following \"often\" or \"very often\": 8/9 (98th percentile) Inattention symptoms and 7/9 (97th percentile) Hyperactivity-Impulsivity symptoms. Overall, the results suggest the client experienced moderate symptoms of inattention and moderate symptoms of hyperactivity/impulsivity in childhood.       Childhood Symptoms Scale--Other Report:   Client's fiance completed the collateral report inventory of childhood symptoms. Based on the collateral contact's recollection of client's childhood symptoms, the client demonstrated the following \"often\" or \"very often\": 7/9 Inattention symptoms and 8/9 Hyperactivity-Impulsivity symptoms. The client's Total ADHD Score was 57. The collateral-report and self-report scores are similar and suggest symptoms of hyperactivity/impulsivity in childhood.  ?   Jennifer Functional Impairment Scale: Self and Other Reports (BFIS)   The BFIS is used to assess an individuals' psychosocial impairment in major life/daily activities that may be due to a mental health disorder. This assessment measure includes self and collateral rating scales. Self-report scores are reported as percentiles. Scores at the 76th-83rd percentile are considered marginal, scores at the 84th-92nd percentile are considered borderline, scores at the 93rd-95th percentile are considered mild, scores at the 96th-98th percentile are considered moderate, and those at the 99th percentile are considered severe. Collateral or \"other\" rating scales are reported as number of symptoms observed in comparison to those reported by the client. Norms and percentile scores are not available for collateral reports.    ?   Results indicate the client " "identified impairment (scores at or greater than 93rd percentile) in the following areas: home-chores, work, social-strangers, social-friends, education, dating/marriage, money management, self-care routines and health maintenance. The client's Mean Impairment Score was 7.75 (99th percentile) indicating the client is reporting severe impairment in functioning across domains. Client's ye completed the collateral rating scale, which indicated discrepant results (e.g., Mean Impairment Score of 8.07). He noted impairment in the areas of: home-family, home-chores, work, social-strangers, social-friends, community activities, education, dating/marriage, money management, daily responsibilities, self-care routines and health maintenance.        Jennifer Deficits in Executive Functioning Scale (BDEFS)   The BDEFS is a measure used for evaluating dimensions of adult executive functioning in daily life. This assessment measure includes self and collateral rating scales. Self-report scores are reported as percentiles. Scores at the 76th-83rd percentile are considered marginal, scores at the 84th-92nd percentile are considered borderline, scores at the 93rd-95th percentile are considered mild, scores at the 96th-98th percentile are considered moderate, and those at the 99th percentile are considered severe. Collateral or \"other\" rating scales are reported as number of symptoms observed in comparison to those reported by the client. Norms and percentile scores are not available for collateral reports.    ?   Results indicate the client's Total Executive Functioning Score was 279 (99th percentile). The ADHD-Executive Functioning Index score was 32 (98th percentile). These scores suggest the client is reporting moderate to severe deficits in executive functioning. These deficits may be due to ADHD or other mental health conditions. They noted the following deficits: self-management to time (severe); " self-organization/problem-solving (severe); self-restraint (moderate); self-motivation (moderate); and self-regulation of emotions (moderate). Client's johannyancé completed the collateral report which indicated discrepant results. They noted deficits in the areas of: self-management to time, self-organization/problem-solving, self-restraint and self-regulation of emotions.      Summary of [place] Multiphasic Personality Inventory--Second Edition    Client completed the [place] Multiphasic Personality Inventory-2 (MMPI-2), a self-report personality inventory, as part of her evaluation. Validity scales indicate that the client responded in a manner that suggested overreporting or exaggeration. There were elevations on most scales which makes it difficult to interpret in a meaningful way. The profiles is invalid and uninterpretable.  ?????   Generalized Anxiety Disorder Questionnaire (TANG-7)   This questionnaire is designed to screen for anxiety in adults. Based on the client's score of 10, they are reporting moderate symptoms of anxiety at this time. Client endorsed the following symptoms of anxiety: feeling nervous/anxious/on edge; not being able to stop or control worrying; worrying too much about different things; trouble relaxing; restlessness; becoming easily annoyed or irritable and feeling afraid as if something awful might happen.  ?   Patient Health Questionnaire- 9 (PHQ-9)    This questionnaire is designed to screen for depression in adults. Based on the client's score of 10, they are reporting moderate symptoms of depression at this time. Client endorsed the following symptoms of depression: little interest or pleasure in doing things; feeling down/depressed/hopeless; trouble falling asleep/staying asleep/sleeping too much; feeling tired or having little energy; poor appetite or overeating; poor concentration and feeling fidgety/restless.   ?   Summary (based on clinical interview, review of records, test  "results):   Client is a 27-year-old, , partnered / significant other individual. Client was referred for a diagnostic assessment by PCP. The purpose of this evaluation is to: provide treatment recommendations and clarify diagnosis. Client's presenting concerns include:  She has a hard time focusing. She is easily distracted. Client can listen in conversations, but sometimes she has to ask people to repeat themselves. She is fidgety and restless and is bouncing her knee or leg up and down. She likes to be doing something with her hands. She puts a lot of effort into starting tasks, but she can procrastinate at times. Sometimes she has difficulty initiating tasks. She usually tries to finish a task before moving onto the next one. She is usually pretty organized, but sometimes \"I let things pile up.\" She usually knows where things are \"but things aren't where they should be.\" Sometimes she spends money she shouldn't be spending. Client is forgetful and relies on lists and calendars to stay on top of tasks. Without reminders and alarms on her phone, she will forget to do things (e.g., she sets 5 alarms for a doctor appointment). She interrupts people and cuts them off at times. Client can relax and watch YouTube videos or play a game, but it is harder to watch movies and read books (\"I have to already be in a relaxed state\"). Client stated that symptoms have resulted in the following functional impairments: academic performance, management of the household and or completion of tasks, relationship(s), self-care, and work / vocational responsibilities. Client has received a previous diagnosis of ADHD in childhood. Client reported that medication has been prescribed to address these problems. Client reported that medication was helpful and did not cause unpleasant side effects. Client was previously prescribed medication around age 7-8 until she was 16/15. She stopped taking the medication \"because they said I " "was doing fine.\" Client reported that these problems began in childhood. Client has attempted to resolve these concerns in the past through medications. Client reported the following previous diagnoses which includes: an Anxiety Disorder and Depression. Client thinks she was also diagnosed with Borderline Personality Disorder in the past (this was established through psychiatry previously - she has done DBT in the past). She estimated this diagnosis was assigned when she was 21 years old (about 2017). Client reported symptoms began in middle school. She had stopped the ADHD medications at that time, and she started struggling in school. Client didn't have a great social life, but she doesn't think this caused depression. Once she was off Adderall, she struggled with making friends a bit, but she has good social connections now.  Client has received mental health services in the past:  psychiatry, medications from PCP, and counseling . Client has also done DBT. Client has been in therapy off and on since age 13/14.  Psychiatric Hospitalizations:  x3. She attempted suicide at age 18 (she attempted suicide by overdose) and again at age 19 (\"having an argument with family and it escalated and the police got called; and they made me go to the hospital\"). She was inpatient for about a week and didn't go back to the hospital again. She attempted suicide as a teenager and was hospitalized each time after these incidents. Client engaged in self-harm (\"hitting myself out of frustration or anger\" around that time - but not since then). Client has not had a hospitalization in the last 5 years. Client denies a history of civil commitment. Client is receiving other mental health services. These include  medications from PCP and therapy. Client is prescribed Wellbutrin, Lexapro, Ativan, and Zoloft by PCP. Client is currently in therapy through [facility]. They have been working together for a few months. This has been " "helpful. Client is stressed about school, work, taking care of stuff at home. She did not report a personal history of chemical dependence.    Client reported she grew up in [place], [place]. She was raised by her biological parents. Her parents were always together. She was the first born of two children. She has a younger brother. Client reported that their childhood was \"okay.\" Client doesn't have many memories about childhood. They lived in a place where they didn't know many people and then they moved. Her parents were both busy with work. Her mother has always been busy (worked nights and slept during the day) and her father \"has not been emotionally available.\" After they moved, their social support network grew a bit. She then spent more time with her grandparents and \"got out more.\" They met her basic needs. She felt they didn't pay attention to her and support her emotional needs. Client described their current relationships with family of origin as \"pretty good.\" She talks to them often. They get along well. She moved to [place] about one year ago to be with her partner. Client reported the following relationship history: a few dating relationships (\"a couple; nothing crazy; I've had issues where I constantly felt abandoned in the past\"). Client's current relationship status is has a partner or significant other for 2 years. They live together. This is a supportive relationship. Client identified their sexual orientation as pansexual. She does not have children. Client identified partner as part of their support system. Client identified the quality of these relationships as fair.    As a child, client reported that she failed to complete assigned chores in the home environment, had problems getting ready for school in the morning, had problems with organization and keeping track of items, misplaced or lost things, forgot school work or other items between home and school, needed frequent reminders by " "parents to be motivated or to complete work, displayed argumentative or oppositional behaviors, and had problems managing temper with frequent emotional outbursts. Client reported difficulty with childhood peer relationships. Client felt teased/bullied growing up. She tried to get along with other kids \"but they did not get along well with me.\"  As a child, client reported having sleep disturbance, including: insomnia . She had some difficulty falling asleep. This became more of an issue as she got older and would go to bed later. Client reported currently experiencing sleep disturbance, including: insomnia.  Sometimes it is hard to fall asleep; her mind is busy sometimes. Client reported sleeping approximately 6-8 hours per night.  Client reported that she has not completed a sleep study.      Client's highest education level was high school graduate and some college. Client graduated high school in 2017. Client did well in middle school. She estimated she obtained mostly Bs and Cs. In high school she obtained mostly Cs and Ds. She thinks that this was due to the fact that she stopped taking ADHD medications and she was less focused on academics because she was distracted by work and social life. She stated, \"My focus wasn't just on schoolwork.\" During the elementary, middle, and high school years, patient recalls academic strengths in the area of social studies and literature. It could be challenging to read books, but she liked writing. Client reported experiencing academic problems in math and science. Client did identify the following learning problems: attention and concentration. Client did receive tutoring services during the school years. She had after school classes that helped kids with homework. Client did not receive special education services. Client reported significant behavior and discipline problems including: disruptive classroom behavior and failure to finish or complete homework. Client would " "procrastinate and intend to start homework later but then not complete it. Sometimes she forgot there was an assignment to do and she wouldn't do it. She would rush at the last minute to get homework done the morning that it was due. Sometimes she turned assignments in that were partially completed. She could be talkative in class in middle school (\"sometimes there were outbursts, and I would talk when I wasn't supposed to\"). She had difficulty paying attention and barely ever took notes. When she struggled with depression later in high school, it was hard to get out of bed and she missed some school. Client did attend post-secondary school. She started this fall at [employer]. Client reported she is taking a break from school this semester \"because things got chaotic, and I had to focus on work and home life.\"     Client reported that she is currently employed part-time. She works as a PCA. She has been at her current job for one year. Client reported that the current job is a good fit for her skills and personality. Client reported that she distractible behavior . She has anxiety about showing up late, so she is very punctual. Sometimes she has made a few mistake \"here and there\" but it is pointed out to her and she corrects her mistakes. Sometimes she can feel bored. She doesn't feel that there is time allowed for her to sit down and relax at all; it is very busy and if there is down time, they are expected to do more work. The job is quite busy and demanding. Client feels she can be distracted sometimes, and other days she can be on task and getting things done. 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  Client has not been terminated from a place of " employment.     Results of testing were indicative of ADHD.?Client is reporting symptoms of inattention and hyperactivity/impulsivity that have been present since childhood. The collateral reports (partner) was similar and was indicative of current and childhood symptoms. Indeed, Client was previously diagnosed with and treated for ADHD in childhood. Client's report during the clinical interview was also indicative of symptoms of ADHD during that time (e.g., distractibility, disruptive behavior, procrastination). Deficits in executive functioning and impairment in functioning were reported. Client reported experiencing issues at home, school, and work. Client's self-report measures suggest they are experiencing moderate anxiety and moderate depression at this time.     Based on the results of clinical interview and psychological testing, the client currently meets criteria for diagnoses of ADHD Combined Presentation; Generalized Anxiety Disorder; and Major Depressive Disorder, Recurrent, Mild. Client also reported a history of diagnosis and treatment for Borderline Personality Disorder. This condition was not assessed in the present evaluation, but such symptoms are important to continue monitoring/assessing/treating as warranted. Client will be provided with the results of testing, diagnosis, and recommendations in their last appointment.      DSM5 Diagnoses: (Sustained by DSM5 Criteria Listed Above)      Major Depressive Disorder, Recurrent, Mild (F33.0)    Generalized Anxiety Disorder (F41.1)    ADHD Combined Presentation (F90.2)    History of Borderline Personality Disorder     Psychosocial & Contextual Factors: stress at work     Recommendations:      1. Schedule a medication evaluation with your physician. Medications are often beneficial in treating anxiety and depression symptoms as well as ADHD. It will be important that each condition is treated, as treatment for one without the other may lead to an  increase in symptoms (e.g., treating ADHD, but not anxiety, can lead to increased anxiety).?   ??   2. Access resources through websites, books, and articles such as those provided in the Adult ADHD Symptom Management handout. ??   ??   3. Consider working with an ADHD  or individual therapist to learn skills to?assist with symptom management, as well as ways to improve relationships, etc. that may have been impacted by your symptoms.?   ??   4. Individual therapy is recommended. Therapies focused on identifying and challenging problematic thought and behavior patterns while increasing the use of healthy coping skills has been found to be effective in treating anxiety and depression. It will be important to set goals in this therapy and work actively toward achieving short-term successes that lead to the completion of each goal. Action-oriented therapies, such as CBT and ACT (Acceptance and Commitment Therapy) are particularly recommended for the treatment of chronic anxiety and depression.??     5. The use of behavioral strategies such as diaphragmatic breathing, guided imagery, and mindfulness is often helpful in the management of anxiety symptoms.      6. ?The following compensatory strategies may be useful to cope with reported inattention symptoms:    a. Maintaining a predictable routine and structured environment that incorporates prioritized checklists and reminders (e.g., Post-Its).   b. When completing tasks, try to focus on one task at a time and complete it in its entirety before moving on to the next task.   c. Minimize background distractions when working on complex tasks. For example, TV, radio or ongoing conversations in the background may hinder ability to focus on the task at hand.   d. Take regular breaks from tasks that require prolonged attention. In general, regular breaks from complex tasks can help prevent lapses in attention, which can result in errors.   e. Outline the steps required to  complete a task prior to beginning it, which can help ensure an organized approach. Use the outline to refer to throughout the task as a reminder of the steps to be completed.      Millie Acosta, PhD, LP   Licensed Psychologist   ?

## 2023-11-17 ENCOUNTER — VIRTUAL VISIT (OUTPATIENT)
Dept: PSYCHOLOGY | Facility: CLINIC | Age: 28
End: 2023-11-17
Payer: COMMERCIAL

## 2023-11-17 DIAGNOSIS — F41.1 GAD (GENERALIZED ANXIETY DISORDER): Primary | ICD-10-CM

## 2023-11-17 DIAGNOSIS — F33.0 MAJOR DEPRESSIVE DISORDER, RECURRENT EPISODE, MILD (H): ICD-10-CM

## 2023-11-17 DIAGNOSIS — F90.2 ATTENTION DEFICIT HYPERACTIVITY DISORDER, COMBINED TYPE: ICD-10-CM

## 2023-11-17 PROCEDURE — 96130 PSYCL TST EVAL PHYS/QHP 1ST: CPT | Mod: 95 | Performed by: PSYCHOLOGIST

## 2023-11-17 PROCEDURE — 96131 PSYCL TST EVAL PHYS/QHP EA: CPT | Mod: 95 | Performed by: PSYCHOLOGIST

## 2023-11-17 NOTE — PROGRESS NOTES
"Client Name: Jennifer Paredes  Date: 11/17/23       Service Type: Individual (ADHD Evaluation feedback session)   ?   Session Start Time: 9:20 Session End Time: 9:40     ?   Session Length: 20 minutes    ?   Session #: (feedback)   ?   Attendees: Client attended alone      Telemedicine Visit: The patient's condition can be safely assessed and treated via synchronous audio and visual telemedicine encounter.      Reason for Telemedicine Visit: Services only offered telehealth    Originating Site (Patient Location): Patient's home      Distant Location (provider location):  Off-site    Consent:  The patient/guardian has verbally consented to: the potential risks and benefits of telemedicine (video visit) versus in person care; bill my insurance or make self-payment for services provided; and responsibility for payment of non-covered services.     Mode of Communication:  Video Conference via [place]    As the provider I attest to compliance with applicable laws and regulations related to telemedicine.     The patient has been notified of the following:      \"We have found that certain health care needs can be provided without the need for a face to face visit.  This service lets us provide the care you need with a phone conversation.       I will have full access to your [facility] medical record during this entire phone call.   I will be taking notes for your medical record.      Since this is like an office visit, we will bill your insurance company for this service.       There are potential benefits and risks of telephone visits (e.g. limits to patient confidentiality) that differ from in-person visits.?  Confidentiality still applies for telephone services, and nobody will record the visit.  It is important to be in a quiet, private space that is free of distractions (including cell phone or other devices) during the visit.??      If during the course of the call I believe a telephone visit is not appropriate, you " "will not be charged for this service\"     Consent has been obtained for this service by care team member: Yes     ?   ?   DATA   ?   ?   Treatment Objective(s) Addressed in This Session:    Provided feedback on ADHD evaluation. Reviewed test results in depth and answered client's questions. Client diagnosed with ADHD Combined Presentation; Generalized Anxiety Disorder; and Major Depressive Disorder, Recurrent, Mild. Reviewed ADHD Coping Skills Handout. This provider also completed full written report of evaluation, including integration of testing data, summary, and recommendations. Please see Documentation Only dated 11/15/23.   ?   Progress on / Status of Treatment Objective(s) / Homework:    Completed    ?   Intervention:   ADHD Evaluation feedback; Reviewed report (can be found in Documentation Only encounter dated 11/15/23); Client was appreciative of the feedback and expressed understanding of the diagnoses.    ?   ?   ASSESSMENT: Current Emotional / Mental Status (status of significant symptoms):   Risk status (Self / Other harm or suicidal ideation)   Client denies current fears or concerns for personal safety.   Client denies current or recent suicidal ideation or behaviors.   Client denies current or recent homicidal ideation or behaviors.   Client denies current or recent self-injurious behavior or ideation.   Client denies other safety concerns.   A safety and risk management plan has not been developed at this time, however client was given the after-hours number / 911 should there be a change in any of these risk factors.   ?   Appearance: Unable to assess on phone   Eye Contact: Unable to assess on phone  Psychomotor Behavior: Unable to assess on phone   Attitude: Cooperative    Orientation: All   Speech   Rate / Production: Normal    Volume: Normal    Mood: Normal   Affect: Appropriate    Thought Content: Clear    Thought Form: Coherent Logical    Insight: Good    ?   Medication Review:   Client is " prescribed Wellbutrin, Lexapro, Ativan, and Zoloft by PCP.     Medication Compliance:   Yes  ?   Changes in Health Issues:   None reported   ?   Chemical Use Review:   Substance Use: Chemical use reviewed, no active concerns identified  ?   Tobacco Use: No current tobacco use.    ?   Collateral Reports Completed:   Routed note to Care Team Member(s)   ?   PLAN: (Homework, other)   ?   Recommendations:         1. Schedule a medication evaluation with your physician. Medications are often beneficial in treating anxiety and depression symptoms as well as ADHD. It will be important that each condition is treated, as treatment for one without the other may lead to an increase in symptoms (e.g., treating ADHD, but not anxiety, can lead to increased anxiety).?   ??   2. Access resources through websites, books, and articles such as those provided in the Adult ADHD Symptom Management handout. ??   ??   3. Consider working with an ADHD  or individual therapist to learn skills to?assist with symptom management, as well as ways to improve relationships, etc. that may have been impacted by your symptoms.?   ??   4. Individual therapy is recommended. Therapies focused on identifying and challenging problematic thought and behavior patterns while increasing the use of healthy coping skills has been found to be effective in treating anxiety and depression. It will be important to set goals in this therapy and work actively toward achieving short-term successes that lead to the completion of each goal. Action-oriented therapies, such as CBT and ACT (Acceptance and Commitment Therapy) are particularly recommended for the treatment of chronic anxiety and depression.??      5. The use of behavioral strategies such as diaphragmatic breathing, guided imagery, and mindfulness is often helpful in the management of anxiety symptoms.      6. ?The following compensatory strategies may be useful to cope with reported inattention  symptoms:    a. Maintaining a predictable routine and structured environment that incorporates prioritized checklists and reminders (e.g., Post-Its).   b. When completing tasks, try to focus on one task at a time and complete it in its entirety before moving on to the next task.   c. Minimize background distractions when working on complex tasks. For example, TV, radio or ongoing conversations in the background may hinder ability to focus on the task at hand.   d. Take regular breaks from tasks that require prolonged attention. In general, regular breaks from complex tasks can help prevent lapses in attention, which can result in errors.   e. Outline the steps required to complete a task prior to beginning it, which can help ensure an organized approach. Use the outline to refer to throughout the task as a reminder of the steps to be completed.      Millie Acosta, PhD,    Licensed Psychologist   ?   Psychological Testing    Billing/Services Summary    ?    Testing Evaluation Services  Base: 15600   (1st 60 mins)  Add-on: 53689   (each addtl 60 mins)    Record Review and Clarify Referral Question    (12:40/1:00), (9/18/23)  20 minutes    Integration/Report Generation    (3:00/4:00), (10/27/23) - MMPI-2  (12:00/1:00), (11/14/23) - Jennifer Scales  (3:00/4:00), (11/15/23) - Report Writing  60 minutes  60 minutes  60 minutes    Interactive Feedback Session   (9:20/9:40), (11/17/23) 20 minutes    Total Time:  220 minutes (3 hours, 40 minutes)    Total Units:  1  3    ?    ?    Diagnosis(es): (ICD-10)   Major Depressive Disorder, Recurrent, Mild (F33.0)  Generalized Anxiety Disorder (F41.1)  ADHD Combined Presentation (F90.2)  History of Borderline Personality Disorder

## 2023-12-22 ENCOUNTER — OFFICE VISIT (OUTPATIENT)
Dept: URGENT CARE | Facility: URGENT CARE | Age: 28
End: 2023-12-22
Payer: COMMERCIAL

## 2023-12-22 VITALS
HEART RATE: 55 BPM | SYSTOLIC BLOOD PRESSURE: 124 MMHG | TEMPERATURE: 98 F | OXYGEN SATURATION: 96 % | DIASTOLIC BLOOD PRESSURE: 78 MMHG

## 2023-12-22 DIAGNOSIS — J06.9 UPPER RESPIRATORY TRACT INFECTION, UNSPECIFIED TYPE: Primary | ICD-10-CM

## 2023-12-22 PROCEDURE — 99213 OFFICE O/P EST LOW 20 MIN: CPT | Performed by: PHYSICIAN ASSISTANT

## 2023-12-22 RX ORDER — BENZONATATE 200 MG/1
200 CAPSULE ORAL 3 TIMES DAILY PRN
Qty: 21 CAPSULE | Refills: 0 | Status: SHIPPED | OUTPATIENT
Start: 2023-12-22

## 2023-12-22 NOTE — PROGRESS NOTES
Upper respiratory tract infection, unspecified type  - benzonatate (TESSALON) 200 MG capsule; Take 1 capsule (200 mg) by mouth 3 times daily as needed for cough    Age 12 months or more  Okay to use Zarbee's   Okay to use Rx Children Tylenol if prescribed (Dose based on weight)    Age 2-12:   Okay to use Children Motrin or Tylenol over the counter.    Adults:  Okay to take acetaminophen 500 mg- 2 tabs (Total of 1000 mg) every 8 hrs   Okay to take ibuprofen 200 mg- 3 tabs (Total of 600 mg) every 6 hours        Okay to use Neti pot for sinus lavage up to three times daily for congestion and sinus pressure if present. Daily hot shower can be beneficial for congestion and body aches. Okay to use bedroom vaporizer or humidifier if symptoms are worse at night. Nightly Vicks Vapor rub and 5-10 mg of Melatonin okay to use for sleep.     Over the counter cough medication and decongestants okay if not prescribed by me during this visit. For homeopathic alternatives to cough syrup and decongestant, feel free to try Elderberry extract.    Okay to use salt water gargles, warm tea (or warm water with lemon and honey), and lozenges for any throat discomfort. Chloraseptic spray is also highly encourages for throat pain/irritation.     Patient will need to get plenty of rest and drink at least 1.5-2 liters of fluids daily for adults and 1-1.5 liters for children. If vomiting and not tolerating liquids for more than 24 hrs, please go to your nearest emergency department for IV fluids and further treatment.     Patient is not contagious after 1 week from start of symptoms. If possible, wear mask for first 7 days. Wash hands regularly and vigorously for 30 seconds often.     Patient was advised to return to clinic for reevaluation (either UC or PCP) if symptoms do not improve in 7 days. Patient educated on red flag symptoms and asked to go directly to the ED if these symptoms present themselves.     [facility]    Subjective   28 year old who presents to clinic today for the following health issues:    Cold Symptoms and Nasal Congestion       HPI     Acute Illness  Acute illness concerns: Congestion   Onset/Duration: 1 week  Symptoms:  Fever: No  Chills/Sweats: No  Headache (location?): No  Sinus Pressure: YES  Conjunctivitis:  No  Ear Pain: no  Rhinorrhea: YES  Congestion: YES  Sore Throat: Mild  Cough: no  Wheeze: No  Decreased Appetite: No  Nausea: No  Vomiting: No  Diarrhea: No  Dysuria/Freq.: No  Dysuria or Hematuria: No  Fatigue/Achiness: fatigue but no body aches   Sick/Strep Exposure: Partner is sick. Home covid-19 test   Therapies tried and outcome: Nyquil, Dayquil, Vitamin c, and [employer] mucus relief, and increased fluids.     Review of Systems   Review of Systems   See HPI    Objective    Temp: 98  F (36.7  C) Temp src: Temporal BP: 124/78 Pulse: 55     SpO2: 96 %       Physical Exam   Physical Exam  Constitutional:       General: She is not in acute distress.     Appearance: Normal appearance. She is normal weight. She is not ill-appearing, toxic-appearing or diaphoretic.   HENT:      Head: Normocephalic and atraumatic.      Right Ear: Tympanic membrane, ear canal and external ear normal. There is no impacted cerumen.      Left Ear: Tympanic membrane, ear canal and external ear normal. There is no impacted cerumen.      Nose: Congestion and rhinorrhea present.      Mouth/Throat:      Mouth: Mucous membranes are moist.      Pharynx: Posterior oropharyngeal erythema present. No oropharyngeal exudate.   Cardiovascular:      Rate and Rhythm: Normal rate and regular rhythm.      Pulses: Normal pulses.      Heart sounds: Normal heart sounds. No murmur heard.     No friction rub. No gallop.   Pulmonary:      Effort: Pulmonary effort is normal. No respiratory distress.      Breath sounds: No stridor. No wheezing, rhonchi or rales.   Chest:      Chest wall: No tenderness.   Lymphadenopathy:       Cervical: No cervical adenopathy.   Neurological:      General: No focal deficit present.      Mental Status: She is alert and oriented to person, place, and time. Mental status is at baseline.      Gait: Gait normal.   Psychiatric:         Mood and Affect: Mood normal.         Behavior: Behavior normal.         Thought Content: Thought content normal.         Judgment: Judgment normal.          No results found for this or any previous visit (from the past 24 hour(s)).

## 2024-01-03 ENCOUNTER — IMMUNIZATION (OUTPATIENT)
Dept: NURSING | Facility: CLINIC | Age: 29
End: 2024-01-03
Payer: COMMERCIAL

## 2024-01-03 PROCEDURE — 91320 SARSCV2 VAC 30MCG TRS-SUC IM: CPT

## 2024-01-03 PROCEDURE — 90480 ADMN SARSCOV2 VAC 1/ONLY CMP: CPT

## 2024-02-28 ENCOUNTER — OFFICE VISIT (OUTPATIENT)
Dept: FAMILY MEDICINE | Facility: CLINIC | Age: 29
End: 2024-02-28
Payer: COMMERCIAL

## 2024-02-28 VITALS
WEIGHT: 250 LBS | TEMPERATURE: 98.7 F | RESPIRATION RATE: 14 BRPM | DIASTOLIC BLOOD PRESSURE: 81 MMHG | BODY MASS INDEX: 37.03 KG/M2 | HEIGHT: 69 IN | HEART RATE: 98 BPM | SYSTOLIC BLOOD PRESSURE: 136 MMHG | OXYGEN SATURATION: 98 %

## 2024-02-28 DIAGNOSIS — J02.9 THROAT SORENESS: ICD-10-CM

## 2024-02-28 DIAGNOSIS — F64.9 GENDER DYSPHORIA: ICD-10-CM

## 2024-02-28 DIAGNOSIS — F90.2 ATTENTION DEFICIT HYPERACTIVITY DISORDER (ADHD), COMBINED TYPE: Primary | ICD-10-CM

## 2024-02-28 PROCEDURE — 99214 OFFICE O/P EST MOD 30 MIN: CPT | Performed by: FAMILY MEDICINE

## 2024-02-28 RX ORDER — ESTRADIOL 2 MG/1
4 TABLET ORAL DAILY
Qty: 60 TABLET | Refills: 1 | Status: SHIPPED | OUTPATIENT
Start: 2024-02-28

## 2024-02-28 RX ORDER — FAMOTIDINE 20 MG/1
20 TABLET, FILM COATED ORAL 2 TIMES DAILY
Qty: 60 TABLET | Refills: 1 | Status: SHIPPED | OUTPATIENT
Start: 2024-02-28 | End: 2024-04-25

## 2024-02-28 RX ORDER — METHYLPHENIDATE HYDROCHLORIDE 10 MG/1
10 TABLET ORAL 2 TIMES DAILY
Qty: 60 TABLET | Refills: 0 | Status: SHIPPED | OUTPATIENT
Start: 2024-02-28 | End: 2024-05-03

## 2024-02-28 SDOH — HEALTH STABILITY: PHYSICAL HEALTH: ON AVERAGE, HOW MANY MINUTES DO YOU ENGAGE IN EXERCISE AT THIS LEVEL?: 10 MIN

## 2024-02-28 SDOH — HEALTH STABILITY: PHYSICAL HEALTH: ON AVERAGE, HOW MANY DAYS PER WEEK DO YOU ENGAGE IN MODERATE TO STRENUOUS EXERCISE (LIKE A BRISK WALK)?: 3 DAYS

## 2024-02-28 ASSESSMENT — PATIENT HEALTH QUESTIONNAIRE - PHQ9
SUM OF ALL RESPONSES TO PHQ QUESTIONS 1-9: 5
SUM OF ALL RESPONSES TO PHQ QUESTIONS 1-9: 5
10. IF YOU CHECKED OFF ANY PROBLEMS, HOW DIFFICULT HAVE THESE PROBLEMS MADE IT FOR YOU TO DO YOUR WORK, TAKE CARE OF THINGS AT HOME, OR GET ALONG WITH OTHER PEOPLE: SOMEWHAT DIFFICULT

## 2024-02-28 ASSESSMENT — SOCIAL DETERMINANTS OF HEALTH (SDOH): HOW OFTEN DO YOU GET TOGETHER WITH FRIENDS OR RELATIVES?: PATIENT DECLINED

## 2024-02-28 NOTE — PATIENT INSTRUCTIONS
Patient Education   Here is the plan from today's visit    1. Attention deficit hyperactivity disorder (ADHD), combined type  Start on the Ritalin twice a day.  About 4 hours apart.   See Dr. Sanchez in 2 - 3 weeks for adjustment  - methylphenidate (RITALIN) 10 MG tablet; Take 1 tablet (10 mg) by mouth 2 times daily  Dispense: 60 tablet; Refill: 0    2. Gender dysphoria  Start 2 mg of the Estradiol. You could actually go to 3 pills a day after 1 week or so if feeling OK. Then come in for follow up   - estradiol (ESTRACE) 2 MG tablet; Take 2 tablets (4 mg) by mouth daily  Dispense: 60 tablet; Refill: 1    3. Throat soreness  Not sure this is reflux but then again, you have GI symptoms - so maybe  Try this twice a day. You should be better within 2 days.  If not, then likely a virus   - famotidine (PEPCID) 20 MG tablet; Take 1 tablet (20 mg) by mouth 2 times daily  Dispense: 60 tablet; Refill: 1      Please call or return to clinic if your symptoms don't go away.    Follow up plan  No follow-ups on file.    Thank you for coming to [facility] today.  Lab Testing:  **If you had lab testing today and your results are reassuring or normal they will be mailed to you or sent through [facility] within 7 days.   **If the lab tests need quick action we will call you with the results.  **If you are having labs done on a different day, please call [phone] to schedule at [facility] or [phone] for other [facility] locations. Labs do not offer walk-in appointments.  The phone number we will call with results is # [phone] (home) . If this is not the best number please call our clinic and change the number.  Medication Refills:  If you need any refills please call your pharmacy and they will contact us.   If you need to  your refill at a new pharmacy, please contact the new pharmacy directly. The new pharmacy will help you get your medications transferred faster.   Scheduling:  If you  have any concerns about today's visit or wish to schedule another appointment please call our office during normal business hours [phone] (8-5:00 M-F). If you can no longer make a scheduled visit, please cancel via [place] or call us to cancel.   If a referral was made to an [facility] specialty provider and you do not get a call from central scheduling, please refer to directions on your visit summary or call our office during normal business hours for assistance.   If a Mammogram was ordered for you at the [facility] call [phone] to schedule or change your appointment.  If you had an XRay/CT/Ultrasound/MRI ordered the number is [phone] to schedule or change your radiology appointment.   [facility] has limited ultrasound appointments available on Wednesdays, if you would like your ultrasound at [facility], please call [phone] to schedule.   Medical Concerns:  If you have urgent medical concerns please call [phone] at any time of the day.    Roseann Kaufman MD

## 2024-02-28 NOTE — PROGRESS NOTES
"  Assessment & Plan     Attention deficit hyperactivity disorder (ADHD), combined type  Was on Concerta as a child and did really well.  Concerta seems to need a prior Auth.  So we will start on Ritalin 10 mg twice daily.  She will see her primary back in a month at which point they can modify the dose and or switch to Concerta if the Ritalin is not working well.  She knows that she needs to be seen every 6 months in person, and that she needs to call in for her 3-month refills.  We talked about her completing a controlled substance agreement but we did not get to that today.  Hopefully can do that at next visit.  - methylphenidate (RITALIN) 10 MG tablet; Take 1 tablet (10 mg) by mouth 2 times daily    Gender dysphoria  Was on estradiol when she was living in [place], and ready to resume.  She was on 6 mg daily.  Start this time with 4 mg and either increase when she is seen back in a month or possibly increased on her own if tolerating.  - estradiol (ESTRACE) 2 MG tablet; Take 2 tablets (4 mg) by mouth daily    Throat soreness  No signs of viral URI.  Not sure this is reflux either but she is welcome to try Pepcid.  Will monitor  - famotidine (PEPCID) 20 MG tablet; Take 1 tablet (20 mg) by mouth 2 times daily        I spent a total of 35 minutes on the day of the visit.   Time spent by me doing chart review, history and exam, documentation and further activities per the note      BMI  Estimated body mass index is 36.92 kg/m  as calculated from the following:    Height as of this encounter: 1.753 m (5' 9\").    Weight as of this encounter: 113.4 kg (250 lb).       Counseling  Appropriate preventive services were discussed with this patient, including applicable screening as appropriate for fall prevention, nutrition, physical activity, Tobacco-use cessation, weight loss and cognition.  Checklist reviewing preventive services available has been given to the patient.  Reviewed patient's diet, addressing concerns " "and/or questions.   She is at risk for lack of exercise and has been provided with information to increase physical activity for the benefit of her well-being.   The patient was instructed to see the dentist every 6 months.   The patient's PHQ-9 score is consistent with mild depression. She was provided with information regarding depression.           No follow-ups on file.    Sage Rodriguez is a 28 year old, presenting for the following health issues:  Consult (Acid reflex patient has issues breathing and some brain fog )      2/28/2024     2:56 PM   Additional Questions   Roomed by lee FRIAS     ADD:  Was on meds as a child - thinks it might have been Concerta. 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    Symptoms she wants managed: trouble taking initiative and focusing on doing things. Disorganized.   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 Struggles with keeping organized schedule. Struggles getting through the list of activities she needs to do as a PCA. Also will miss hearing things or forget what she was told.         Gender:  Is on lamin and wondering if she can start estrogen. Does not smoke. No history of blood clots.    Having more acid reflux and for the past few days has been all day every day. Usually eats mostly vegetables but over the last few weeks chicken and barbecue sauce. Has tried TUMS - helps sort of.   Main problem is throat feels gummy and scratchy - phlegm.  Less heart burn. But more gas and nausea. Some diarrhea too   Nose feels fine.       Testing for long covid: breathless and brain fog and fatigue. COVID diagnosis in 2022.                  Objective    /81   Pulse 98   Temp 98.7  F (37.1  C)   Resp 14   Ht 1.753 m (5' 9\")   Wt 113.4 kg (250 lb)   SpO2 98%   BMI 36.92 kg/m    Body mass index is 36.92 kg/m .  Physical Exam   OP: mild erythema of the posterior pharynx. No exudate or tonsilar enlargement. No neck adenopathy        "     Signed Electronically by: Roseann Kaufman MD    Answers submitted by the patient for this visit:  Patient Health Questionnaire (Submitted on 2/28/2024)  If you checked off any problems, how difficult have these problems made it for you to do your work, take care of things at home, or get along with other people?: Somewhat difficult  PHQ9 TOTAL SCORE: 5

## 2024-02-28 NOTE — COMMUNITY RESOURCES LIST (ENGLISH)
02/28/2024   [facility]  N/A  For questions about this resource list or additional care needs, please contact your primary care clinic or care manager.  Phone: [phone]   Email: N/A   Address: [address] [place] [address]   Hours: N/A        Financial Stability       Rent and mortgage payment assistance  1  [place] [employer] - Multifamily Rental Assistance Program Distance: 0.17 miles      Phone/Virtual   [address] [facility] [address] [place] [address]  Language: English  Hours: Mon - Fri 8:00 AM - 5:00 PM Appt. Only  Fees: Free   Phone: (233) 770-3681 Email: m[place].[employer]@[facility]. Website: https://www.[place].gov/index.html     2  [facility] Health and Wellness - Security Deposit Assistance Distance: 0.34 miles      In-Person   [address] [facility] [address] [place] [address]  Language: English  Hours: Mon - Fri 8:00 AM - 4:30 PM  Fees: Free   Phone: (878) 524-6298 Email: raquel@[facility]. Website: https://www.[facility]./your-government/departments/health-and-wellness          Food and Nutrition       Food pantry  3  hospitals Service [place] Distance: 0.89 miles      [facility]   [address] [place] [address]  Language: English  Hours: Mon 9:00 AM - 11:00 AM , Wed 9:00 AM - 11:00 AM , Thu 1:00 PM - 3:00 PM  Fees: Free, Self Pay   Phone: (990) 912-4747 Email: Marlin@[facility].org Website: http://[facility] Hospital[facility]Space[facility]orth.org/[facility]/ri-adjz-f-7th-[place]/     [address] [place] [address] Food Market Distance: 1.26 miles      Pickup   [address] [place] [address]  Language: Maltese, English, Hmong, Sindy, Guamanian  Hours: Mon 1:00 PM - 4:00 PM , Mon 5:00 PM - 6:30 PM , Tue - Fri 9:00 AM - 11:30 AM , Tue - Fri 1:00 PM - 3:30 PM  Fees: Free   Phone: (438) 817-5727 Website: https://[facility].org/     SNAP application assistance  5  [facility] and  Wellness Distance: 0.34 miles      In-Person, Phone/Virtual   [address] [place] [address]  Language: English  Hours: Mon - Fri 8:00 AM - 4:30 PM  Fees: Free   Phone: (276) 554-4664 Email: raquel@[facility]. Website: https://www.[facility]./your-government/departments/health-and-wellness     6  [place] Department of Human Services - MNFoodHelper (SNAP) Distance: 0.63 miles      Phone/Virtual   [address] [address] [place] [address]  Language: English, Hmong, Costa Rican, Turkish, Mohawk, Egyptian  Hours: Mon - Fri 9:00 AM - 5:00 PM  Fees: Free   Phone: (937) 665-5019 Website: https://[place].gov/dhs/people-we-serve/adults/economic-assistance/food-nutrition/programs-and-services/supplemental-nutrition-assistance-program.jsp     Soup kitchen or free meals  7  [facility] & Programs Distance: 1.02 miles      In-Person   [address] [place] [address]  Language: English  Hours: Mon - Sun 6:30 AM - 7:30 AM , Mon - Sun 12:00 PM - 1:00 PM , Mon - Sun 5:30 PM - 6:30 PM  Fees: Free   Phone: (281) 713-9609 Email: info@[facility].[employer] Website: https://[facility].org/about-us/contact/     8  [facility] Office - [place] and [facility] Distance: 1.05 miles      [address] [address]  Language: English  Hours: Mon - Fri 5:00 PM - 6:00 PM  Fees: Free   Phone: (534) 227-9213 Email: miguel angel@[employer] Website: http://www.[employer].org/          Transportation       Free or low-cost transportation  9  Joint venture between [facility] Circulator Bus Distance: 3.46 miles      In-Person   [address] [place] [address]  Language: English  Hours: Tue 9:00 AM - 2:00 PM  Fees: Self Pay   Phone: (680) 598-6603 Email: info@[employer].org Website: http://www.dartsconnects.org/     10  [employer] Distance: 5.24 miles      In-Person   [address] [place] [address]  Language: English, Mohawk  Hours: Mon 9:00 AM - 5:00 PM , Tue 9:30 AM -  7:00 PM , Wed 9:00 AM - 5:00 PM , Thu 9:30 AM - 7:00 PM , Fri 9:00 AM - 5:00 PM  Fees: Free   Phone: (281) 740-8473 Email: contactus@[employer] Website: http://www.[employer]uBET     Transportation to medical appointments  11  [facility] - Non-Emergency Medical Transportation Distance: 0.73 miles      In-Person   [address] [place] [address]  Language: English  Hours: Mon - Fri 8:00 AM - 4:00 PM Appt. Only  Fees: Self Pay   Phone: (667) 215-2026 Website: http://www.[employer].org/Medical-Services/Emergency-medical-services/Non-emergency-transportation/     12  [employer] Ride Distance: 4.65 miles      In-Person   [address] [address]  Language: English  Hours: Mon - Thu 6:00 AM - 6:00 PM , Fri 6:00 AM - 5:00 PM  Fees: Insurance, Self Pay   Phone: (466) 461-7908 Email: office@[employer] Website: https://www.[employer])/          Important Numbers & Websites       Emergency Services   911  City Services   311  Poison Control   (562) 610-7532  Suicide Prevention Lifeline   (570) 535-7756 (TALK)  Child Abuse Hotline   (360) 550-1509 (4-A-Child)  Sexual Assault Hotline   (527) 892-4637 (HOPE)  National Runaway Safeline   (857) 337-6744 (RUNAWAY)  All-Options Talkline   (280) 703-9156  Substance Abuse Referral   (234) 977-9330 (HELP)

## 2024-03-06 ENCOUNTER — CARE COORDINATION (OUTPATIENT)
Dept: PSYCHOLOGY | Facility: CLINIC | Age: 29
End: 2024-03-06
Payer: COMMERCIAL

## 2024-03-06 ASSESSMENT — ANXIETY QUESTIONNAIRES
5. BEING SO RESTLESS THAT IT IS HARD TO SIT STILL: NOT AT ALL
GAD7 TOTAL SCORE: 4
3. WORRYING TOO MUCH ABOUT DIFFERENT THINGS: SEVERAL DAYS
1. FEELING NERVOUS, ANXIOUS, OR ON EDGE: SEVERAL DAYS
6. BECOMING EASILY ANNOYED OR IRRITABLE: SEVERAL DAYS
IF YOU CHECKED OFF ANY PROBLEMS ON THIS QUESTIONNAIRE, HOW DIFFICULT HAVE THESE PROBLEMS MADE IT FOR YOU TO DO YOUR WORK, TAKE CARE OF THINGS AT HOME, OR GET ALONG WITH OTHER PEOPLE: SOMEWHAT DIFFICULT
4. TROUBLE RELAXING: NOT AT ALL
2. NOT BEING ABLE TO STOP OR CONTROL WORRYING: SEVERAL DAYS
7. FEELING AFRAID AS IF SOMETHING AWFUL MIGHT HAPPEN: NOT AT ALL

## 2024-03-06 NOTE — PROGRESS NOTES
Behavioral Health Home Services  [facility]: [place]        Social Work Care Navigator Note      Patient: Sada Paredes  Date: April 5, 2024  Preferred Name: Jennifer    Previous PHQ-9:       10/27/2023     8:11 AM 2/28/2024     8:55 AM 3/26/2024     3:47 PM   PHQ-9 SCORE   PHQ-9 Total Score MyChart 8 (Mild depression) 5 (Mild depression) 5 (Mild depression)   PHQ-9 Total Score 8 5 5     Previous TANG-7:       9/15/2023     4:13 PM 9/18/2023     9:27 AM   TANG-7 SCORE   Total Score  13 (moderate anxiety)   Total Score 17 13    13     ARUNA LEVEL:       No data to display                Preferred Contact:  Need for : No  Preferred Contact: Zita      Type of Contact Today: Face to Face in Clinic    Service Modality: In-person      Data: (subjective / Objective):    Patient came in to complete the comprehensive wellness assessment for Behavioral Health Home Services.  See [facility] Flowsheets for details on the assessment.  See [facility], [facility] for a copy of the patient's care plan.    Carolyn Garcia, Social Work Care Coordinator        Next 5 appointments (look out 90 days)      Apr 05, 2024  2:30 PM  Lab visit with [facility]  [facility] ([facility] ) [address] [place] [address] [phone]              ABIODUN met with patient today for [facility] enrollment. She is in clinic today with her partner Silviano.   Today we completed [facility] enrollment. Completed flowsheet and DHS forms signed.     We also completed Health action plan. Goals are listed below:     Health  -consistently see PCP and arrive for scheduled appointments  -take medication consistently / find medications that work best for dx  -work on exercise, going to [facility]    Mental health  -get re-connected with [employer] (Arnoldo)  -get re-connected with [facility] worker (Charli)  -Interested in Writing more    Social Service:   -would like to lead up to getting Targeted  Case Management     [facility] team to provide ongoing support and services to patient. Patient is aware of upcoming appt with PCP end of this month.     SW to reach out to mental health team to get patient back on track / scheduled. Tuesdays and Thursdays are best for appts in the afternoons. HPI      ROS      Physical Exam

## 2024-03-07 NOTE — PROGRESS NOTES
3/7/24    [facility] SW put in [employer] referral for additional food resources.   SW also called and left message for [employer] [facility] to get re-connected.     MARYAM Hayes  Behavioral Health Home- Social Work Care Coordinator

## 2024-03-26 ENCOUNTER — VIRTUAL VISIT (OUTPATIENT)
Dept: FAMILY MEDICINE | Facility: CLINIC | Age: 29
End: 2024-03-26
Payer: COMMERCIAL

## 2024-03-26 DIAGNOSIS — M79.10 MUSCLE SORENESS: ICD-10-CM

## 2024-03-26 DIAGNOSIS — R53.83 OTHER FATIGUE: ICD-10-CM

## 2024-03-26 DIAGNOSIS — F64.9 GENDER DYSPHORIA: ICD-10-CM

## 2024-03-26 DIAGNOSIS — F90.2 ATTENTION DEFICIT HYPERACTIVITY DISORDER (ADHD), COMBINED TYPE: Primary | ICD-10-CM

## 2024-03-26 PROCEDURE — 99214 OFFICE O/P EST MOD 30 MIN: CPT | Mod: 95

## 2024-03-26 RX ORDER — DEXTROAMPHETAMINE SACCHARATE, AMPHETAMINE ASPARTATE MONOHYDRATE, DEXTROAMPHETAMINE SULFATE AND AMPHETAMINE SULFATE 5; 5; 5; 5 MG/1; MG/1; MG/1; MG/1
20 CAPSULE, EXTENDED RELEASE ORAL DAILY
Qty: 30 CAPSULE | Refills: 0 | Status: SHIPPED | OUTPATIENT
Start: 2024-03-26 | End: 2024-04-25

## 2024-03-26 NOTE — PROGRESS NOTES
Assessment & Plan     Attention deficit hyperactivity disorder (ADHD), combined type  Started ritalin a month ago--has been helpful with sxs but wearing off early despite BID dosing. Used adderall XR as an adolescent which helped with symptoms. Discussed options--TID dosing with ritalin, switching to concerta, or switching to adderall XR (let patient know about the shortages). Patient would like to try switching to adderall XR. 30 day prescription sent, follow up in clinic recommended at that point for BP check.  - amphetamine-dextroamphetamine (ADDERALL XR) 20 MG 24 hr capsule; Take 1 capsule (20 mg) by mouth daily for 30 days    Gender dysphoria  Doing well. Restarted estradiol 4g a month ago. Overall feeling well. No changes that she has noticed yet beyond feeling affirmed in her gender. No SOB, LE unilateral swelling, mood instability. Due for labs.   - Testosterone total; Future  - Hepatic function panel; Future  - Glucose; Future  - Lipid Profile; Future  - Hemoglobin; Future  - Estradiol; Future    Other fatigue  Muscle soreness  Reports 6 months of dyspnea with exertional household activities--laundry, dishwashing, as well as muscle aches with these activities. No fevers, unexpected weight loss. Was worried about long covid, however she had covid in 2022, time course does not match with symptoms. Unable to perform physial exam today due to virtual visit. Ddx includes deconditioning, myositis, hypothyroidism, chornic fatigue syndrome.  - Erythrocyte sedimentation rate auto; Future  - CRP inflammation; Future  - TSH with free T4 reflex; Future  - CK total; Future    Follow up in 1 month in person.    Sage Rodriguez is a 28 year old, presenting for the following health issues:  Video Visit (/)    HPI     Had covid-19 in late 2022. After there weren't any lingering symptoms. Starting 5-6 months ago, started having trouble breathing, started getting more lethargic.     Breathing: hard to get a full deep  breath of air. No pain, but more like something feels empty. Maybe has a slight pressure. Stomach problems make it worse, also the more tired she gets the worse it gets. No asthma hx. Does not smoke.     Fatigue: muscles feel more tired, the more she moves, the more tired she gets. No muscle soreness. Carrying things, standing and dishwashing, laundry are all things she used to do but now tiring her out. Sleep is baseline.    Nutrition: Eats lots of fruit and vegetables, white meats, sometimes steak and fish,    Started taking estradiol 4mg a day--wanting to go up to 6mg eventually. Had a 6 month break. Started back up a month ago.     Used adderall in the past--25mg once a day, in grade school and middle school. Thoguht it was oncerta, but then asked her mom since last visit, and mom said it was aderall.  Feels like the ritalin helps but is not lasting her through the day, even taking it twice a day        Objective    There were no vitals taken for this visit.  There is no height or weight on file to calculate BMI.  Physical Exam  Constitutional:       General: She is not in acute distress.     Appearance: Normal appearance.   HENT:      Head: Normocephalic and atraumatic.   Pulmonary:      Effort: Pulmonary effort is normal. No respiratory distress.   Musculoskeletal:         General: No swelling or deformity.   Skin:     Findings: No rash.   Neurological:      Mental Status: She is alert and oriented to person, place, and time. Mental status is at baseline.   Psychiatric:         Mood and Affect: Mood normal.         Behavior: Behavior normal.         Thought Content: Thought content normal.                  Signed Electronically by: Kaur Sanchez MD    Answers submitted by the patient for this visit:  Patient Health Questionnaire (Submitted on 3/26/2024)  If you checked off any problems, how difficult have these problems made it for you to do your work, take care of things at home, or get along with other  people?: Somewhat difficult  PHQ9 TOTAL SCORE: 5

## 2024-03-26 NOTE — PATIENT INSTRUCTIONS
[facility]  Lab and Imaging Center  Floor 1  [address] [place] [address]  Hours:   Monday-Friday: 6:30AM - 5:00PM  Saturday: 7:00AM - 12:00PM  Appointment needed  Phone: [phone]    [facility]  Outpatient Lab  [facility], First Floor  [address] [place] [address]  Hours:   Monday - Friday 7:30a-5:30p  No appointment needed - Walk-Ins available  **For adult patients only    [facility]  First Floor Draw Station  [address] [facility] [address]  Hours:   Monday-Friday 7:30a-5:30p  Appointment needed  Phone: [phone]    [facility]  12th floor of [facility] [place] [place]   [address] [place] [address]    Hours: Monday-Friday 7:30am-4:30pm  Walk- Ins Available  Phone: [phone]  **For pediatric patients only

## 2024-04-05 ENCOUNTER — LAB (OUTPATIENT)
Dept: LAB | Facility: CLINIC | Age: 29
End: 2024-04-05
Payer: COMMERCIAL

## 2024-04-05 DIAGNOSIS — R53.83 OTHER FATIGUE: ICD-10-CM

## 2024-04-05 DIAGNOSIS — M79.10 MUSCLE SORENESS: ICD-10-CM

## 2024-04-05 DIAGNOSIS — F64.9 GENDER DYSPHORIA: ICD-10-CM

## 2024-04-05 LAB
ERYTHROCYTE [SEDIMENTATION RATE] IN BLOOD BY WESTERGREN METHOD: 13 MM/HR (ref 0–20)
HGB BLD-MCNC: 15 G/DL (ref 11.7–17.7)

## 2024-04-05 PROCEDURE — 85652 RBC SED RATE AUTOMATED: CPT

## 2024-04-05 PROCEDURE — 36415 COLL VENOUS BLD VENIPUNCTURE: CPT

## 2024-04-05 PROCEDURE — 82550 ASSAY OF CK (CPK): CPT

## 2024-04-05 PROCEDURE — 80061 LIPID PANEL: CPT

## 2024-04-05 PROCEDURE — 85018 HEMOGLOBIN: CPT

## 2024-04-05 PROCEDURE — 84403 ASSAY OF TOTAL TESTOSTERONE: CPT

## 2024-04-05 PROCEDURE — 86140 C-REACTIVE PROTEIN: CPT

## 2024-04-05 PROCEDURE — 82670 ASSAY OF TOTAL ESTRADIOL: CPT

## 2024-04-05 PROCEDURE — 82947 ASSAY GLUCOSE BLOOD QUANT: CPT

## 2024-04-05 PROCEDURE — 80076 HEPATIC FUNCTION PANEL: CPT

## 2024-04-05 PROCEDURE — 84443 ASSAY THYROID STIM HORMONE: CPT

## 2024-04-06 LAB
ALBUMIN SERPL BCG-MCNC: 4.4 G/DL (ref 3.5–5.2)
ALP SERPL-CCNC: 106 U/L (ref 40–150)
ALT SERPL W P-5'-P-CCNC: 56 U/L (ref 0–70)
AST SERPL W P-5'-P-CCNC: 29 U/L (ref 0–45)
BILIRUB DIRECT SERPL-MCNC: <0.2 MG/DL (ref 0–0.3)
BILIRUB SERPL-MCNC: 0.5 MG/DL
CHOLEST SERPL-MCNC: 183 MG/DL
CK SERPL-CCNC: 36 U/L (ref 26–308)
CRP SERPL-MCNC: 5.33 MG/L
ESTRADIOL SERPL-MCNC: 112 PG/ML
FASTING STATUS PATIENT QL REPORTED: ABNORMAL
FASTING STATUS PATIENT QL REPORTED: NORMAL
GLUCOSE SERPL-MCNC: 94 MG/DL (ref 70–99)
HDLC SERPL-MCNC: 38 MG/DL
LDLC SERPL CALC-MCNC: 118 MG/DL
NONHDLC SERPL-MCNC: 145 MG/DL
PROT SERPL-MCNC: 7.5 G/DL (ref 6.4–8.3)
TRIGL SERPL-MCNC: 133 MG/DL
TSH SERPL DL<=0.005 MIU/L-ACNC: 1.16 UIU/ML (ref 0.3–4.2)

## 2024-04-09 ENCOUNTER — DOCUMENTATION ONLY (OUTPATIENT)
Dept: PSYCHOLOGY | Facility: CLINIC | Age: 29
End: 2024-04-09
Payer: COMMERCIAL

## 2024-04-09 PROBLEM — F33.0 MAJOR DEPRESSIVE DISORDER, RECURRENT EPISODE, MILD DEGREE (H): Status: ACTIVE | Noted: 2023-03-29

## 2024-04-09 PROBLEM — F90.2 ATTENTION DEFICIT HYPERACTIVITY DISORDER, COMBINED TYPE: Status: ACTIVE | Noted: 2024-04-09

## 2024-04-09 LAB — TESTOST SERPL-MCNC: 24 NG/DL (ref 8–950)

## 2024-04-09 NOTE — PROGRESS NOTES
Behavioral Health Home Diagnostic Assessment Review    PATIENT'S NAME: Sada Pareeds  MRN:   [id number]  :   1995  DATE OF Review 2024    Date of Diagnostic Assessment:  23 Dx assessment by Dr. Acosta, 10/27/23 Dx assessment by Dr. Acosta and 11/15/23 ADHD evaluation by Dr Acosta  Mental Health Provider and Clinic: [facility]    DSM5 Diagnoses:  F33.0 MDD recurrent episode mild, F41.1 TANG, F90.2 ADHD combined type  Promis was completed 23      Cage-AID score is:       2023    12:45 PM   CAGE-AID Total Score   Total Score 0   Total Score MyChart 0 (A total score of 2 or greater is considered clinically significant)         [facility] RECOMMENDATIONS/PLAN:     Diagnostic assessment has been reviewed and patient has met diagnostic criteria for enrollment into the Behavioral Health Home program.      Keyonna Wilson PsyD, LP  Behavioral Health Clinician  [facility] Medicine

## 2024-04-25 ENCOUNTER — CARE COORDINATION (OUTPATIENT)
Dept: PSYCHOLOGY | Facility: CLINIC | Age: 29
End: 2024-04-25
Payer: COMMERCIAL

## 2024-04-25 DIAGNOSIS — F64.9 GENDER DYSPHORIA: ICD-10-CM

## 2024-04-25 DIAGNOSIS — Z79.890 HORMONE REPLACEMENT THERAPY: ICD-10-CM

## 2024-04-25 DIAGNOSIS — J02.9 THROAT SORENESS: ICD-10-CM

## 2024-04-25 NOTE — TELEPHONE ENCOUNTER
"Request for medication refill:  estradiol (ESTRACE) 2 MG tablet ()   Providers if patient needs an appointment and you are willing to give a one month supply please refill for one month and  send a letter/MyChart using \".SMILLIMITEDREFILL\" .smillimited and route chart to \"P [facility] \" (Giving one month refill in non controlled medications is strongly recommended before denial)    If refill has been denied, meaning absolutely no refills without visit, please complete the smart phrase \".smirxrefuse\" and route it to the \"P [facility] MED REFILLS\"  pool to inform the patient and the pharmacy.    Marielena Sykes MA     "

## 2024-04-25 NOTE — TELEPHONE ENCOUNTER
"Request for medication refill:    famotidine (PEPCID) 20 MG tablet     Providers if patient needs an appointment and you are willing to give a one month supply please refill for one month and  send a letter/MyChart using \".SMILLIMITEDREFILL\" .smillimited and route chart to \"P [facility] \" (Giving one month refill in non controlled medications is strongly recommended before denial)    If refill has been denied, meaning absolutely no refills without visit, please complete the smart phrase \".smirxrefuse\" and route it to the \"P [facility] MED REFILLS\"  pool to inform the patient and the pharmacy.    Marielena Sykes MA     "

## 2024-04-25 NOTE — PROGRESS NOTES
Behavioral Health Home Services  [facility]: [place]  Social Work Care Navigator Note      Patient: Sada Paredes  Date: April 25, 2024  Preferred Name: Jennifer    Type of Contact Today: Phone call (patient / identified key support person reached)    Service Modality: Phone Visit:      Provider verified identity through the following two step process.  Patient provided:  Patient is known previously to provider    Telephone Visit: The patient's condition can be safely assessed and treated via synchronous audio telemedicine encounter.      Data: (subjective / Objective):  Recent ED/IP Admission or Discharge?   None    [facility] Core Service Provided:  Care Coordination: provided care management services/referrals necessary to ensure patient and their identified supports have access to medical, behavioral health, pharmacology and recovery support services.  Ensured that patient's care is integrated across all settings and services.     Current Stressors / Issues / Care Plan Objective Addressed Today:  -patient in need of follow up visit with PCP per chart review  -touch base on current supports in the community such as mental health    Intervention:  Motivational Interviewing: Open-ended questions       Assessment: (Progress on Goals / Homework):  -Scheduled patient for follow up with PCP next Friday 5/3  -plan to meet with [facility] team following provider visit to discuss mental health supports.     Plan: (Homework, other):  Patient was encouraged to continue to seek condition-related information and education.      Scheduled a Clinic follow up appointment with ABIODUN CC and PCP in 1 week     Patient has set self-identified goals and will monitor progress until the next appointment on: 05/03/2024.     Carolyn Garcia, Social Work Care Coordinator     Previous PHQ-9:       10/27/2023     8:11 AM 2/28/2024     8:55 AM 3/26/2024     3:47 PM   PHQ-9 SCORE   PHQ-9 Total Score MyChart 8 (Mild depression) 5 (Mild depression) 5 (Mild  depression)   PHQ-9 Total Score 8 5 5     Previous TANG-7:       9/15/2023     4:13 PM 9/18/2023     9:27 AM   TANG-7 SCORE   Total Score  13 (moderate anxiety)   Total Score 17 13    13     ARUNA LEVEL:       No data to display                Preferred Contact:  Need for : No  Preferred Contact: Zita    Patient Goals:  Goal Areas: Health; Mental Health; Financial and Social Service Benefits  Patient stated goals: Health  -consistently see PCP and arrive for scheduled appointments  -take medication consistently / find medications that work best for dx  -work on exercise, going to [facility]    Mental health  -get re-connected with [employer] (Arnoldo)  -get re-connected with [facility] worker (Charli)  -Interested in Writing more    Social Service:   -would like to lead up to getting Targeted Case Management

## 2024-04-26 RX ORDER — FAMOTIDINE 20 MG/1
20 TABLET, FILM COATED ORAL 2 TIMES DAILY
Qty: 60 TABLET | Refills: 1 | Status: SHIPPED | OUTPATIENT
Start: 2024-04-26 | End: 2024-09-30

## 2024-04-26 RX ORDER — ESTRADIOL 2 MG/1
6 TABLET ORAL DAILY
Qty: 270 TABLET | Refills: 0 | Status: SHIPPED | OUTPATIENT
Start: 2024-04-26 | End: 2024-08-05

## 2024-05-03 ENCOUNTER — OFFICE VISIT (OUTPATIENT)
Dept: FAMILY MEDICINE | Facility: CLINIC | Age: 29
End: 2024-05-03
Payer: COMMERCIAL

## 2024-05-03 ENCOUNTER — CARE COORDINATION (OUTPATIENT)
Dept: PSYCHOLOGY | Facility: CLINIC | Age: 29
End: 2024-05-03
Payer: COMMERCIAL

## 2024-05-03 VITALS
OXYGEN SATURATION: 98 % | DIASTOLIC BLOOD PRESSURE: 73 MMHG | HEIGHT: 69 IN | RESPIRATION RATE: 18 BRPM | SYSTOLIC BLOOD PRESSURE: 123 MMHG | HEART RATE: 94 BPM | BODY MASS INDEX: 36.79 KG/M2 | WEIGHT: 248.4 LBS

## 2024-05-03 DIAGNOSIS — F90.2 ATTENTION DEFICIT HYPERACTIVITY DISORDER, COMBINED TYPE: Primary | ICD-10-CM

## 2024-05-03 DIAGNOSIS — F64.9 GENDER DYSPHORIA: ICD-10-CM

## 2024-05-03 DIAGNOSIS — F33.0 MAJOR DEPRESSIVE DISORDER, RECURRENT EPISODE, MILD DEGREE (H): ICD-10-CM

## 2024-05-03 DIAGNOSIS — Z79.890 HORMONE REPLACEMENT THERAPY: ICD-10-CM

## 2024-05-03 LAB
AMPHETAMINES UR QL: DETECTED
BARBITURATES UR QL SCN: NOT DETECTED
BENZODIAZ UR QL SCN: NOT DETECTED
BUPRENORPHINE UR QL: NOT DETECTED
CANNABINOIDS UR QL: DETECTED
COCAINE UR QL SCN: NOT DETECTED
D-METHAMPHET UR QL: NOT DETECTED
METHADONE UR QL SCN: NOT DETECTED
OPIATES UR QL SCN: NOT DETECTED
OXYCODONE UR QL SCN: NOT DETECTED
PCP UR QL SCN: NOT DETECTED
TRICYCLICS UR QL SCN: NOT DETECTED

## 2024-05-03 PROCEDURE — 80306 DRUG TEST PRSMV INSTRMNT: CPT

## 2024-05-03 PROCEDURE — 90746 HEPB VACCINE 3 DOSE ADULT IM: CPT

## 2024-05-03 PROCEDURE — 90471 IMMUNIZATION ADMIN: CPT

## 2024-05-03 PROCEDURE — 90651 9VHPV VACCINE 2/3 DOSE IM: CPT

## 2024-05-03 PROCEDURE — 99214 OFFICE O/P EST MOD 30 MIN: CPT | Mod: 25

## 2024-05-03 PROCEDURE — 90472 IMMUNIZATION ADMIN EACH ADD: CPT

## 2024-05-03 RX ORDER — DEXTROAMPHETAMINE SACCHARATE, AMPHETAMINE ASPARTATE, DEXTROAMPHETAMINE SULFATE AND AMPHETAMINE SULFATE 5; 5; 5; 5 MG/1; MG/1; MG/1; MG/1
20 TABLET ORAL 2 TIMES DAILY
COMMUNITY

## 2024-05-03 RX ORDER — SPIRONOLACTONE 100 MG/1
100 TABLET, FILM COATED ORAL DAILY
Qty: 90 TABLET | Refills: 1 | Status: SHIPPED | OUTPATIENT
Start: 2024-05-03 | End: 2024-08-16

## 2024-05-03 RX ORDER — DEXTROAMPHETAMINE SACCHARATE, AMPHETAMINE ASPARTATE MONOHYDRATE, DEXTROAMPHETAMINE SULFATE AND AMPHETAMINE SULFATE 5; 5; 5; 5 MG/1; MG/1; MG/1; MG/1
20 CAPSULE, EXTENDED RELEASE ORAL DAILY
Qty: 30 CAPSULE | Refills: 0 | Status: SHIPPED | OUTPATIENT
Start: 2024-05-03 | End: 2024-06-02

## 2024-05-03 RX ORDER — DEXTROAMPHETAMINE SACCHARATE, AMPHETAMINE ASPARTATE MONOHYDRATE, DEXTROAMPHETAMINE SULFATE AND AMPHETAMINE SULFATE 5; 5; 5; 5 MG/1; MG/1; MG/1; MG/1
20 CAPSULE, EXTENDED RELEASE ORAL DAILY
Qty: 30 CAPSULE | Refills: 0 | Status: SHIPPED | OUTPATIENT
Start: 2024-06-03 | End: 2024-07-03

## 2024-05-03 RX ORDER — DEXTROAMPHETAMINE SACCHARATE, AMPHETAMINE ASPARTATE, DEXTROAMPHETAMINE SULFATE AND AMPHETAMINE SULFATE 5; 5; 5; 5 MG/1; MG/1; MG/1; MG/1
20 TABLET ORAL 2 TIMES DAILY
Status: CANCELLED | OUTPATIENT
Start: 2024-05-03

## 2024-05-03 RX ORDER — DEXTROAMPHETAMINE SACCHARATE, AMPHETAMINE ASPARTATE MONOHYDRATE, DEXTROAMPHETAMINE SULFATE AND AMPHETAMINE SULFATE 5; 5; 5; 5 MG/1; MG/1; MG/1; MG/1
20 CAPSULE, EXTENDED RELEASE ORAL DAILY
Qty: 30 CAPSULE | Refills: 0 | Status: SHIPPED | OUTPATIENT
Start: 2024-07-04 | End: 2024-08-03

## 2024-05-03 NOTE — PROGRESS NOTES
Assessment & Plan     Gender dysphoria  Patient doing well, not noticing any negative side effects.  Mildly decreased libido but not distressing.  Previously on 3 mg twice daily for multiple years without problems.  Last estrogen check on 2 mg twice daily was 116, patient desires increase in dosage which I think is appropriate.  Increase to 30 mg twice daily.  Will follow-up in 3 months for repeat labs.  -Estrogen 3 mg twice daily  - spironolactone (ALDACTONE) 100 MG tablet; Take 1 tablet (100 mg) by mouth daily    Attention deficit hyperactivity disorder, combined type  Switch to Adderall XR at last visit which is doing well for her.  Lasting her throughout the day, no issues with appetite or sleep.  Improved functional capacity with finishing tasks and staying focused.  UDS indicated for collection today.  Due for refills.  - Urine Drug Screen Clinic; Future  - amphetamine-dextroamphetamine (ADDERALL XR) 20 MG 24 hr capsule; Take 1 capsule (20 mg) by mouth daily for 30 days  - amphetamine-dextroamphetamine (ADDERALL XR) 20 MG 24 hr capsule; Take 1 capsule (20 mg) by mouth daily for 30 days  - amphetamine-dextroamphetamine (ADDERALL XR) 20 MG 24 hr capsule; Take 1 capsule (20 mg) by mouth daily for 30 days  - Urine Drug Screen Clinic    MDD  Stable, doing well on her Zoloft.  Getting  next week.  No mood concerns today.    Follow-up in 3 months for HRT follow-up, increased dosage of estrogen at this visit.    Sage Rodriguez is a 28 year old, presenting for the following health issues:  Follow Up (Medication follow up + refill )      5/3/2024     1:52 PM   Additional Questions   Roomed by Davonte   Accompanied by self         5/3/2024    Information    services provided? No     HPI     Getting  next week! It's been a busy time. Mother in law is moving in which has also been busy.      Gender care:  No mood swings. Sometimes gets headaches. No body changes that she has  "necessarily noticed since we last talked.   Was previously on 6mg for years, then when she moved to [place] been on and off, now has been 2-3 months that she has been on the estrogen.     At first noticed ED, it improved but is not bothersome.    ADHD:  Sleep: No problems  Appetite: Does suppress her appetite, but she is good about eating before taking it, and usually has worn off by dinner. Has lost weight over the past year but thinks this is just because she has been more active.   Functional improvements: Can process things better, is more goal oriented, better at completing her tasks          Objective    /73 (BP Location: Left arm, Patient Position: Sitting, Cuff Size: Adult Large)   Pulse 94   Resp 18   Ht 1.753 m (5' 9\")   Wt 112.7 kg (248 lb 6.4 oz)   SpO2 98%   BMI 36.68 kg/m    Body mass index is 36.68 kg/m .  Physical Exam   Constitutional: awake, alert, cooperative, no apparent distress, and appears stated age  Eyes: Lids and lashes normal, pupils equal, round and reactive to light, extra ocular muscles intact, sclera clear, conjunctiva normal  ENT: normocepalic, without obvious abnormality  Hematologic / Lymphatic: no cervical lymphadenopathy and no supraclavicular lymphadenopathy  Respiratory: No increased work of breathing, good air exchange, clear to auscultation bilaterally, no crackles or wheezing  Cardiovascular: Normal apical impulse, regular rate and rhythm, normal S1 and S2, no S3 or S4, and no murmur noted  GI: soft, non-distended and non-tender  Musculoskeletal: no lower extremity pitting edema present  full range of motion noted  tone is normal          Signed Electronically by: Kaur Sanchez MD    Answers submitted by the patient for this visit:  Patient Health Questionnaire (Submitted on 5/3/2024)  If you checked off any problems, how difficult have these problems made it for you to do your work, take care of things at home, or get along with other people?: Somewhat " difficult  PHQ9 TOTAL SCORE: 5

## 2024-05-03 NOTE — PATIENT INSTRUCTIONS
Patient Education   Here is the plan from today's visit    1. Hormone replacement therapy  - spironolactone (ALDACTONE) 100 MG tablet; Take 1 tablet (100 mg) by mouth daily  Dispense: 90 tablet; Refill: 1    3. Attention deficit hyperactivity disorder, combined type  - Urine Drug Screen Clinic; Future  - amphetamine-dextroamphetamine (ADDERALL XR) 20 MG 24 hr capsule; Take 1 capsule (20 mg) by mouth daily for 30 days  Dispense: 30 capsule; Refill: 0  - amphetamine-dextroamphetamine (ADDERALL XR) 20 MG 24 hr capsule; Take 1 capsule (20 mg) by mouth daily for 30 days  Dispense: 30 capsule; Refill: 0  - amphetamine-dextroamphetamine (ADDERALL XR) 20 MG 24 hr capsule; Take 1 capsule (20 mg) by mouth daily for 30 days  Dispense: 30 capsule; Refill: 0  - Urine Drug Screen Clinic      Please call or return to clinic if your symptoms don't go away.    Follow up plan  Return in about 3 months (around 8/3/2024).    Thank you for coming to [facility] today.  Lab Testing:  **If you had lab testing today and your results are reassuring or normal they will be mailed to you or sent through [facility] within 7 days.   **If the lab tests need quick action we will call you with the results.  **If you are having labs done on a different day, please call [phone] to schedule at [facility] or [phone] for other [facility] Lab locations. Labs do not offer walk-in appointments.  The phone number we will call with results is # [phone] (home) . If this is not the best number please call our clinic and change the number.  Medication Refills:  If you need any refills please call your pharmacy and they will contact us.   If you need to  your refill at a new pharmacy, please contact the new pharmacy directly. The new pharmacy will help you get your medications transferred faster.   Scheduling:  If you have any concerns about today's visit or wish to schedule another appointment please call our office  during normal business hours [phone] (8-5:00 M-F). If you can no longer make a scheduled visit, please cancel via [facility] Link or call us to cancel.   If a referral was made to an [facility] specialty provider and you do not get a call from central scheduling, please refer to directions on your visit summary or call our office during normal business hours for assistance.   If a Mammogram was ordered for you at the Breast Center call [phone] to schedule or change your appointment.  If you had an XRay/CT/Ultrasound/MRI ordered the number is [phone] to schedule or change your radiology appointment.   [facility] has limited ultrasound appointments available on Wednesdays, if you would like your ultrasound at [facility], please call [phone] to schedule.   Medical Concerns:  If you have urgent medical concerns please call [phone] at any time of the day.    Kaur Sanchez MD

## 2024-05-03 NOTE — PROGRESS NOTES
Behavioral Health Home Services  [facility]: [place]    Social Work Care Navigator Note    Patient: Sada Paredes  Date: May 3, 2024  Preferred Name: Jennifer    Previous PHQ-9:       2/28/2024     8:55 AM 3/26/2024     3:47 PM 5/3/2024     1:45 PM   PHQ-9 SCORE   PHQ-9 Total Score MyChart 5 (Mild depression) 5 (Mild depression) 5 (Mild depression)   PHQ-9 Total Score 5 5 5     Previous TANG-7:       9/15/2023     4:13 PM 9/18/2023     9:27 AM   TANG-7 SCORE   Total Score  13 (moderate anxiety)   Total Score 17 13    13     ARUNA LEVEL:       No data to display                Preferred Contact:  Need for : No  Preferred Contact: Zita      Type of Contact Today: Face to Face in Clinic    Service Modality: In-person      Data: (subjective / Objective):  Recent ED/IP Admission or Discharge?   None    Patient Goals:  Goal Areas: Health; Mental Health; Financial and Social Service Benefits  Patient stated goals: Health  -consistently see PCP and arrive for scheduled appointments  -take medication consistently / find medications that work best for dx  -work on exercise, going to [facility]    Mental health  -get re-connected with [employer] (Arnoldo)  -get re-connected with [facility] worker (Charli)  -Interested in Writing more    Social Service:   -would like to lead up to getting Targeted Case Management        [facility] Core Service Provided:  Care Coordination: provided care management services/referrals necessary to ensure patient and their identified supports have access to medical, behavioral health, pharmacology and recovery support services.  Ensured that patient's care is integrated across all settings and services.     Current Stressors / Issues / Care Plan Objective Addressed Today:  -getting  to partner Silviano May 12th. Family is coming to town from [place]   -needs to get connected to a dentist  -moving partner's mom into their 2 bedroom apartment to be able to provide better care. (She is PCA for  partner's mom)    Intervention:  Motivational Interviewing: Open-ended questions       Assessment: (Progress on Goals / Homework):  - CC to reach out after patient's wedding to assist with getting patient connected with  supports and Dentist.     Plan: (Homework, other):  Patient was encouraged to continue to seek condition-related information and education.         Carolyn Garcia, Social Work Care Coordinator

## 2024-05-13 ENCOUNTER — CARE COORDINATION (OUTPATIENT)
Dept: PSYCHOLOGY | Facility: CLINIC | Age: 29
End: 2024-05-13
Payer: COMMERCIAL

## 2024-05-13 NOTE — PROGRESS NOTES
"Behavioral Health Home Services  [facility]: [place]    Community Health Worker Note    Patient: Sada Paredes  Date: May 13, 2024  Preferred Name: Jennifer    Previous PHQ-9:       2/28/2024     8:55 AM 3/26/2024     3:47 PM 5/3/2024     1:45 PM   PHQ-9 SCORE   PHQ-9 Total Score MyChart 5 (Mild depression) 5 (Mild depression) 5 (Mild depression)   PHQ-9 Total Score 5 5 5     Previous TANG-7:       9/15/2023     4:13 PM 9/18/2023     9:27 AM   TANG-7 SCORE   Total Score  13 (moderate anxiety)   Total Score 17 13    13     ARUNA LEVEL:       No data to display                Preferred Contact:  Need for : No  Preferred Contact: Zita      Type of Contact Today: Phone call (patient / identified key support person reached)    Service Modality: Phone Visit:      Provider verified identity through the following two step process.  Patient provided:  Patient is known previously to provider    Telephone Visit: The patient's condition can be safely assessed and treated via synchronous audio telemedicine encounter.      Reason for Audio Telemedicine Visit: Patient convenience (e.g. access to timely appointments / distance to available provider)    Consent:  The patient/guardian has verbally consented to:     1. The potential risks and benefits of telemedicine (telephone visit) versus in person care;    The patient has been notified of the following:      \"We have found that certain health care needs can be provided without the need for a face to face visit.  This service lets us provide the care you need with a phone conversation.       I will have full access to your [facility] medical record during this entire phone call.   I will be taking notes for your medical record.      Since this is like an office visit, we will bill your insurance company for this service.       There are potential benefits and risks of telephone visits (e.g. limits to patient confidentiality) that differ from in-person " "visits.?Confidentiality still applies for telephone services, and nobody will record the visit.  It is important to be in a quiet, private space that is free of distractions (including cell phone or other devices) during the visit.??      If during the course of the call I believe a telephone visit is not appropriate, you will not be charged for this service\"     Consent has been obtained for this service by care team member: Yes     Data: (Subjective / Objective):  Recent ED/IP Admission or Discharge?   None    Patient Goals:  Goal Areas: Health; Mental Health; Financial and Social Service Benefits  Patient stated goals: Health  -consistently see PCP and arrive for scheduled appointments  -take medication consistently / find medications that work best for dx  -work on exercise, going to [facility]    Mental health  -get re-connected with [employer] (Arnoldo)  -get re-connected with [facility] worker (Charli)  -Interested in Writing more    Social Service:   -would like to lead up to getting Targeted Case Management      [facility] Core Service Provided:  Care Coordination: provided care management services/referrals necessary to ensure patient and their identified supports have access to medical, behavioral health, pharmacology and recovery support services.  Ensured that patient's care is integrated across all settings and services.     Current Reported Stressors / Issues / Health Action Plan Items Addressed Today:  CHW telephone out reach to patient. CHW was able to enroll Jennifer in [employer] program. They will deliver fresh veggies once a week starting 6/13. Jennifer still needs assistance in finding a dentist, CHW to follow up on this.    Plan: (Homework, other):  Patient was encouraged to continue to seek condition-related information and education.        Patient has set self-identified goals and will monitor progress until the next appointment.   Dorothy Greenberg, Community Health Worker       Community Health Worker has reviewed " upcoming appointments with patient. Encouraged attendance.      Future Appointments 5/13/2024 - 11/9/2024      Mary Ann Greenberg  Community Health Worker  May 13, 2024

## 2024-06-03 ENCOUNTER — MYC REFILL (OUTPATIENT)
Dept: FAMILY MEDICINE | Facility: CLINIC | Age: 29
End: 2024-06-03
Payer: COMMERCIAL

## 2024-06-03 DIAGNOSIS — Z79.890 HORMONE REPLACEMENT THERAPY: ICD-10-CM

## 2024-06-03 DIAGNOSIS — F64.9 GENDER DYSPHORIA: ICD-10-CM

## 2024-06-03 RX ORDER — DEXTROAMPHETAMINE SACCHARATE, AMPHETAMINE ASPARTATE, DEXTROAMPHETAMINE SULFATE AND AMPHETAMINE SULFATE 5; 5; 5; 5 MG/1; MG/1; MG/1; MG/1
20 TABLET ORAL 2 TIMES DAILY
OUTPATIENT
Start: 2024-06-03

## 2024-06-03 NOTE — TELEPHONE ENCOUNTER
"Request for medication refill:  amphetamine-dextroamphetamine (ADDERALL) 20 MG tablet     Providers if patient needs an appointment and you are willing to give a one month supply please refill for one month and  send a letter/MyChart using \".SMILLIMITEDREFILL\" .smillimited and route chart to \"P [facility] \" (Giving one month refill in non controlled medications is strongly recommended before denial)    If refill has been denied, meaning absolutely no refills without visit, please complete the smart phrase \".smirxrefuse\" and route it to the \"P SMI MED REFILLS\"  pool to inform the patient and the pharmacy.    Micky Chaparro, [facility]      "

## 2024-06-04 RX ORDER — SPIRONOLACTONE 100 MG/1
100 TABLET, FILM COATED ORAL DAILY
Qty: 90 TABLET | Refills: 1 | OUTPATIENT
Start: 2024-06-04

## 2024-06-04 NOTE — TELEPHONE ENCOUNTER
Duplicate request spironolactone (ALDACTONE) 100 MG tablet script sent 5/3/24 for 90 days with 1 refill    Qiana Radford RN

## 2024-06-04 NOTE — TELEPHONE ENCOUNTER
Duplicate request for spironolactone (ALDACTONE) 100 MG tablet script sent 5/3/24 for 90 days with 1 refill    Qiana Radford RN

## 2024-06-11 NOTE — TELEPHONE ENCOUNTER
RN called pharmacy and they do have script for June and July on file but pt needs to call and ask for it to be filled. They are out of stock until tomorrow, staff stated they will put a note in but pt should call tomorrow. RN called pt and let them know. Pt verbalized understanding.    Qiana Radford RN

## 2024-06-11 NOTE — TELEPHONE ENCOUNTER
[facility] phone call message- general phone call:    Reason for call: Patient states that she was told by pharmacy that they do not have this medication. Patient said she picked it up back in May but has not picked it up for this month yet due to them not having it.    Return call needed: Yes    OK to leave a message on voice mail? Yes    Primary language: English      needed? No    Call taken on June 11, 2024 at 12:47 PM by Enriqueta Rehman

## 2024-07-19 ENCOUNTER — CARE COORDINATION (OUTPATIENT)
Dept: PSYCHOLOGY | Facility: CLINIC | Age: 29
End: 2024-07-19
Payer: COMMERCIAL

## 2024-07-19 NOTE — PROGRESS NOTES
"Behavioral Health Home Services  [facility]: [place]    Community Health Worker Note    Patient: Sada Paredes  Date: July 19, 2024  Preferred Name: Jennifer    Previous PHQ-9:       2/28/2024     8:55 AM 3/26/2024     3:47 PM 5/3/2024     1:45 PM   PHQ-9 SCORE   PHQ-9 Total Score MyChart 5 (Mild depression) 5 (Mild depression) 5 (Mild depression)   PHQ-9 Total Score 5 5 5     Previous TANG-7:       9/15/2023     4:13 PM 9/18/2023     9:27 AM   TANG-7 SCORE   Total Score  13 (moderate anxiety)   Total Score 17 13    13     ARUNA LEVEL:       No data to display                Preferred Contact:  Need for : No  Preferred Contact: Zita      Type of Contact Today: Phone call (patient / identified key support person reached)    Service Modality: Phone Visit:      Provider verified identity through the following two step process.  Patient provided:  Patient is known previously to provider    Telephone Visit: The patient's condition can be safely assessed and treated via synchronous audio telemedicine encounter.      Reason for Audio Telemedicine Visit: Patient convenience       Consent:  The patient/guardian has verbally consented to:     1. The potential risks and benefits of telemedicine (telephone visit) versus in person care;    The patient has been notified of the following:      \"We have found that certain health care needs can be provided without the need for a face to face visit.  This service lets us provide the care you need with a phone conversation.       I will have full access to your [facility] medical record during this entire phone call.   I will be taking notes for your medical record.      Since this is like an office visit, we will bill your insurance company for this service.       There are potential benefits and risks of telephone visits (e.g. limits to patient confidentiality) that differ from in-person visits.?Confidentiality still applies for telephone services, and nobody will " "record the visit.  It is important to be in a quiet, private space that is free of distractions (including cell phone or other devices) during the visit.??      If during the course of the call I believe a telephone visit is not appropriate, you will not be charged for this service\"     Consent has been obtained for this service by care team member: Yes     Data: (Subjective / Objective):  Recent ED/IP Admission or Discharge?   None    Patient Goals:  Goal Areas: Health; Mental Health; Financial and Social Service Benefits  Patient stated goals: Health  -consistently see PCP and arrive for scheduled appointments  -take medication consistently / find medications that work best for dx  -work on exercise, going to [facility]    Mental health  -get re-connected with [employer] (Arnoldo)  -get re-connected with [facility] worker (Charli)  -Interested in Writing more    Social Service:   -would like to lead up to getting Targeted Case Management      [facility] Core Service Provided:  Care Coordination: provided care management services/referrals necessary to ensure patient and their identified supports have access to medical, behavioral health, pharmacology and recovery support services.  Ensured that patient's care is integrated across all settings and services.     Current Reported Stressors / Issues / Health Action Plan Items Addressed Today:  CHW telephone outreach to Jennifer. Jennifer has not yet connected with a dentist but plans to call when they get back from [place] next week. No current concerns or questions for [facility] team. Things are going well.     Plan: (Homework, other):  Patient was encouraged to continue to seek condition-related information and education.        Patient has set self-identified goals and will monitor progress until the next appointment.   Dorothy Greenberg, Community Health Worker       Community Health Worker has reviewed upcoming appointments with patient. Encouraged attendance.      Future Appointments " 7/19/2024 - 1/15/2025      None              Dorothy Greenberg  Community Health Worker  July 19, 2024

## 2024-08-01 DIAGNOSIS — Z79.890 HORMONE REPLACEMENT THERAPY: ICD-10-CM

## 2024-08-01 DIAGNOSIS — F64.9 GENDER DYSPHORIA: ICD-10-CM

## 2024-08-01 NOTE — TELEPHONE ENCOUNTER
"Request for medication refill:      estradiol (ESTRACE) 2 MG tablet       Providers if patient needs an appointment and you are willing to give a one month supply please refill for one month and  send a letter/MyChart using \".SMILLIMITEDREFILL\" .smillimited and route chart to \"P [facility] \" (Giving one month refill in non controlled medications is strongly recommended before denial)    If refill has been denied, meaning absolutely no refills without visit, please complete the smart phrase \".smirxrefuse\" and route it to the \"P SMI MED REFILLS\"  pool to inform the patient and the pharmacy.    Emerald Dash MA      "

## 2024-08-05 DIAGNOSIS — Z79.890 HORMONE REPLACEMENT THERAPY: Primary | ICD-10-CM

## 2024-08-05 RX ORDER — ESTRADIOL 2 MG/1
TABLET ORAL
Qty: 90 TABLET | Refills: 0 | Status: SHIPPED | OUTPATIENT
Start: 2024-08-05 | End: 2024-08-16

## 2024-08-07 ENCOUNTER — CARE COORDINATION (OUTPATIENT)
Dept: PSYCHOLOGY | Facility: CLINIC | Age: 29
End: 2024-08-07
Payer: COMMERCIAL

## 2024-08-07 NOTE — PROGRESS NOTES
Behavioral Health Home Services  Type of Contact: Phone call (patient / identified key support person reached)  Carolyn Garcia Social Work Care Coordinator    Care Coordination: provided care management services/referrals necessary to ensure patient and their identified supports have access to medical, behavioral health, pharmacology and recovery support services.  Ensured that patient's care is integrated across all settings and services.     Telephone outreach to patient today. Patient reports doing well right now.   SW reviewed upcoming appointments with patient. Lab tomorrow and PCP appt next Friday. Patient appreciated reminder.     Plan for [facility] team to see patient at appt next week when she is in clinic.     MARYAM Hayes  Behavioral Health Home- Social Work Care Coordinator

## 2024-08-15 ENCOUNTER — LAB (OUTPATIENT)
Dept: LAB | Facility: CLINIC | Age: 29
End: 2024-08-15
Payer: COMMERCIAL

## 2024-08-15 ENCOUNTER — CARE COORDINATION (OUTPATIENT)
Dept: PSYCHOLOGY | Facility: CLINIC | Age: 29
End: 2024-08-15

## 2024-08-15 DIAGNOSIS — Z79.890 HORMONE REPLACEMENT THERAPY: ICD-10-CM

## 2024-08-15 LAB
ALBUMIN SERPL BCG-MCNC: 4.1 G/DL (ref 3.5–5.2)
ALP SERPL-CCNC: 95 U/L (ref 40–150)
ALT SERPL W P-5'-P-CCNC: 25 U/L (ref 0–70)
AST SERPL W P-5'-P-CCNC: 18 U/L (ref 0–45)
BILIRUB DIRECT SERPL-MCNC: <0.2 MG/DL (ref 0–0.3)
BILIRUB SERPL-MCNC: 0.3 MG/DL
CHOLEST SERPL-MCNC: 204 MG/DL
ESTRADIOL SERPL-MCNC: 73 PG/ML
FASTING STATUS PATIENT QL REPORTED: ABNORMAL
HDLC SERPL-MCNC: 38 MG/DL
HGB BLD-MCNC: 14.1 G/DL (ref 11.7–17.7)
LDLC SERPL CALC-MCNC: 124 MG/DL
NONHDLC SERPL-MCNC: 166 MG/DL
PROT SERPL-MCNC: 7.1 G/DL (ref 6.4–8.3)
TRIGL SERPL-MCNC: 212 MG/DL

## 2024-08-15 PROCEDURE — 82670 ASSAY OF TOTAL ESTRADIOL: CPT

## 2024-08-15 PROCEDURE — 80061 LIPID PANEL: CPT

## 2024-08-15 PROCEDURE — 84403 ASSAY OF TOTAL TESTOSTERONE: CPT

## 2024-08-15 PROCEDURE — 36415 COLL VENOUS BLD VENIPUNCTURE: CPT

## 2024-08-15 PROCEDURE — 85018 HEMOGLOBIN: CPT

## 2024-08-15 PROCEDURE — 80076 HEPATIC FUNCTION PANEL: CPT

## 2024-08-15 NOTE — PROGRESS NOTES
"Behavioral Health Home Services  [facility]: [place]    Community Health Worker Note    Patient: Sada Paredes  Date: August 15, 2024  Preferred Name: Jennifer    Previous PHQ-9:       2/28/2024     8:55 AM 3/26/2024     3:47 PM 5/3/2024     1:45 PM   PHQ-9 SCORE   PHQ-9 Total Score MyChart 5 (Mild depression) 5 (Mild depression) 5 (Mild depression)   PHQ-9 Total Score 5 5 5     Previous TANG-7:       9/15/2023     4:13 PM 9/18/2023     9:27 AM   TANG-7 SCORE   Total Score  13 (moderate anxiety)   Total Score 17 13    13     ARUNA LEVEL:       No data to display                Preferred Contact:  Need for : No  Preferred Contact: Zita      Type of Contact Today: Phone call (patient / identified key support person reached)    Service Modality: Phone Visit:      Provider verified identity through the following two step process.  Patient provided:  Patient is known previously to provider    Telephone Visit: The patient's condition can be safely assessed and treated via synchronous audio telemedicine encounter.      Reason for Audio Telemedicine Visit: Patient convenience (e.g. access to timely appointments / distance to available provider)    Consent:  The patient/guardian has verbally consented to:     1. The potential risks and benefits of telemedicine (telephone visit) versus in person care;    The patient has been notified of the following:      \"We have found that certain health care needs can be provided without the need for a face to face visit.  This service lets us provide the care you need with a phone conversation.       I will have full access to your [facility] medical record during this entire phone call.   I will be taking notes for your medical record.      Since this is like an office visit, we will bill your insurance company for this service.       There are potential benefits and risks of telephone visits (e.g. limits to patient confidentiality) that differ from in-person " "visits.?Confidentiality still applies for telephone services, and nobody will record the visit.  It is important to be in a quiet, private space that is free of distractions (including cell phone or other devices) during the visit.??      If during the course of the call I believe a telephone visit is not appropriate, you will not be charged for this service\"     Consent has been obtained for this service by care team member: Yes     Data: (Subjective / Objective):  Recent ED/IP Admission or Discharge?   None    Patient Goals:  Goal Areas: Health; Mental Health; Financial and Social Service Benefits  Patient stated goals: Health  -consistently see PCP and arrive for scheduled appointments  -take medication consistently / find medications that work best for dx  -work on exercise, going to [facility]    Mental health  -get re-connected with [employer] (Arnoldo)  -get re-connected with [facility] worker (Charli)  -Interested in Writing more    Social Service:   -would like to lead up to getting Targeted Case Management      [facility] Core Service Provided:  Care Coordination: provided care management services/referrals necessary to ensure patient and their identified supports have access to medical, behavioral health, pharmacology and recovery support services.  Ensured that patient's care is integrated across all settings and services.     Current Reported Stressors / Issues / Health Action Plan Items Addressed Today:  CHW telephone outreach for appointment reminder.    Jennifer will attend appt with PCP tomorrow. [facility] team to check in after that appointment.    Plan: (Homework, other):  Patient was encouraged to continue to seek condition-related information and education.        Patient has set self-identified goals and will monitor progress until the next appointment on: 8/16/24.     Dorothy Greenberg, Community Health Worker       Community Health Worker has reviewed upcoming appointments with patient. Encouraged " attendance.      Future Appointments 8/15/2024 - 2/11/2025        Date Visit Type Length Department Provider     8/15/2024  4:30 PM MYCHART LAB VISIT 15 min RD LABORATORY RD LAB    Location Instructions:     The clinic is located at [address], [address] in [place].&nbsp; Parking is available in the red ramp attached to the [facility].              8/16/2024  3:20 PM MYC UMP RETURN 20 min [facility] Kaur Sanchez MD    Location Instructions:     [facility] is in [address] at [address] in [place]. This is at the [place]&nbsp; corner of the intersection of [facility] and along the [place] [address], one mile south of [place] [address]. Free lot parking is available.                        Dorothy Greenberg  Community Health Worker  August 15, 2024

## 2024-08-16 ENCOUNTER — CARE COORDINATION (OUTPATIENT)
Dept: PSYCHOLOGY | Facility: CLINIC | Age: 29
End: 2024-08-16

## 2024-08-16 ENCOUNTER — OFFICE VISIT (OUTPATIENT)
Dept: FAMILY MEDICINE | Facility: CLINIC | Age: 29
End: 2024-08-16
Payer: COMMERCIAL

## 2024-08-16 VITALS
TEMPERATURE: 98.3 F | BODY MASS INDEX: 36.17 KG/M2 | DIASTOLIC BLOOD PRESSURE: 77 MMHG | RESPIRATION RATE: 18 BRPM | SYSTOLIC BLOOD PRESSURE: 108 MMHG | WEIGHT: 244.2 LBS | HEIGHT: 69 IN | OXYGEN SATURATION: 98 % | HEART RATE: 88 BPM

## 2024-08-16 DIAGNOSIS — Z79.890 HORMONE REPLACEMENT THERAPY: ICD-10-CM

## 2024-08-16 DIAGNOSIS — F64.9 GENDER DYSPHORIA: ICD-10-CM

## 2024-08-16 DIAGNOSIS — F90.2 ATTENTION DEFICIT HYPERACTIVITY DISORDER, COMBINED TYPE: Primary | ICD-10-CM

## 2024-08-16 PROCEDURE — 90471 IMMUNIZATION ADMIN: CPT

## 2024-08-16 PROCEDURE — 90472 IMMUNIZATION ADMIN EACH ADD: CPT

## 2024-08-16 PROCEDURE — 99214 OFFICE O/P EST MOD 30 MIN: CPT | Mod: 25

## 2024-08-16 PROCEDURE — 90746 HEPB VACCINE 3 DOSE ADULT IM: CPT

## 2024-08-16 PROCEDURE — 90651 9VHPV VACCINE 2/3 DOSE IM: CPT

## 2024-08-16 RX ORDER — SPIRONOLACTONE 100 MG/1
100 TABLET, FILM COATED ORAL DAILY
Qty: 90 TABLET | Refills: 1 | Status: SHIPPED | OUTPATIENT
Start: 2024-08-16

## 2024-08-16 RX ORDER — DEXTROAMPHETAMINE SACCHARATE, AMPHETAMINE ASPARTATE MONOHYDRATE, DEXTROAMPHETAMINE SULFATE AND AMPHETAMINE SULFATE 5; 5; 5; 5 MG/1; MG/1; MG/1; MG/1
20 CAPSULE, EXTENDED RELEASE ORAL DAILY
Qty: 30 CAPSULE | Refills: 0 | Status: SHIPPED | OUTPATIENT
Start: 2024-10-15 | End: 2024-11-14

## 2024-08-16 RX ORDER — DEXTROAMPHETAMINE SACCHARATE, AMPHETAMINE ASPARTATE, DEXTROAMPHETAMINE SULFATE AND AMPHETAMINE SULFATE 1.25; 1.25; 1.25; 1.25 MG/1; MG/1; MG/1; MG/1
5 TABLET ORAL DAILY
Qty: 30 TABLET | Refills: 0 | Status: SHIPPED | OUTPATIENT
Start: 2024-08-16 | End: 2024-09-15

## 2024-08-16 RX ORDER — DEXTROAMPHETAMINE SACCHARATE, AMPHETAMINE ASPARTATE, DEXTROAMPHETAMINE SULFATE AND AMPHETAMINE SULFATE 1.25; 1.25; 1.25; 1.25 MG/1; MG/1; MG/1; MG/1
5 TABLET ORAL DAILY
Qty: 30 TABLET | Refills: 0 | Status: SHIPPED | OUTPATIENT
Start: 2024-10-15 | End: 2024-09-30

## 2024-08-16 RX ORDER — DEXTROAMPHETAMINE SACCHARATE, AMPHETAMINE ASPARTATE, DEXTROAMPHETAMINE SULFATE AND AMPHETAMINE SULFATE 5; 5; 5; 5 MG/1; MG/1; MG/1; MG/1
20 TABLET ORAL 2 TIMES DAILY
Status: CANCELLED | OUTPATIENT
Start: 2024-08-16

## 2024-08-16 RX ORDER — DEXTROAMPHETAMINE SACCHARATE, AMPHETAMINE ASPARTATE MONOHYDRATE, DEXTROAMPHETAMINE SULFATE AND AMPHETAMINE SULFATE 5; 5; 5; 5 MG/1; MG/1; MG/1; MG/1
20 CAPSULE, EXTENDED RELEASE ORAL DAILY
Qty: 30 CAPSULE | Refills: 0 | Status: SHIPPED | OUTPATIENT
Start: 2024-09-15 | End: 2024-10-15

## 2024-08-16 RX ORDER — DEXTROAMPHETAMINE SACCHARATE, AMPHETAMINE ASPARTATE, DEXTROAMPHETAMINE SULFATE AND AMPHETAMINE SULFATE 1.25; 1.25; 1.25; 1.25 MG/1; MG/1; MG/1; MG/1
5 TABLET ORAL DAILY
Qty: 30 TABLET | Refills: 0 | Status: SHIPPED | OUTPATIENT
Start: 2024-09-15 | End: 2024-10-15

## 2024-08-16 RX ORDER — ESTRADIOL 2 MG/1
3 TABLET ORAL 2 TIMES DAILY
Qty: 90 TABLET | Refills: 0 | Status: SHIPPED | OUTPATIENT
Start: 2024-08-16

## 2024-08-16 RX ORDER — DEXTROAMPHETAMINE SACCHARATE, AMPHETAMINE ASPARTATE MONOHYDRATE, DEXTROAMPHETAMINE SULFATE AND AMPHETAMINE SULFATE 5; 5; 5; 5 MG/1; MG/1; MG/1; MG/1
20 CAPSULE, EXTENDED RELEASE ORAL DAILY
Qty: 30 CAPSULE | Refills: 0 | Status: SHIPPED | OUTPATIENT
Start: 2024-08-16 | End: 2024-09-15

## 2024-08-16 ASSESSMENT — ANXIETY QUESTIONNAIRES
7. FEELING AFRAID AS IF SOMETHING AWFUL MIGHT HAPPEN: SEVERAL DAYS
8. IF YOU CHECKED OFF ANY PROBLEMS, HOW DIFFICULT HAVE THESE MADE IT FOR YOU TO DO YOUR WORK, TAKE CARE OF THINGS AT HOME, OR GET ALONG WITH OTHER PEOPLE?: SOMEWHAT DIFFICULT
GAD7 TOTAL SCORE: 6

## 2024-08-16 ASSESSMENT — PATIENT HEALTH QUESTIONNAIRE - PHQ9
10. IF YOU CHECKED OFF ANY PROBLEMS, HOW DIFFICULT HAVE THESE PROBLEMS MADE IT FOR YOU TO DO YOUR WORK, TAKE CARE OF THINGS AT HOME, OR GET ALONG WITH OTHER PEOPLE: SOMEWHAT DIFFICULT
SUM OF ALL RESPONSES TO PHQ QUESTIONS 1-9: 6
SUM OF ALL RESPONSES TO PHQ QUESTIONS 1-9: 6

## 2024-08-16 NOTE — PROGRESS NOTES
Preceptor Attestation:   Patient seen, evaluated and discussed with the resident. I have verified the content of the note, which accurately reflects my assessment of the patient and the plan of care.   Supervising Physician:  Forrest Obregon MD

## 2024-08-16 NOTE — PROGRESS NOTES
Behavioral Health Home Services  [facility]: [place]  Social Work Care Navigator Note      Patient: Sada Paredes  Date: August 16, 2024  Preferred Name: Jennifer    Previous PHQ-9:       3/26/2024     3:47 PM 5/3/2024     1:45 PM 8/16/2024     3:08 PM   PHQ-9 SCORE   PHQ-9 Total Score MyChart 5 (Mild depression) 5 (Mild depression) 6 (Mild depression)   PHQ-9 Total Score 5 5 6     Previous TANG-7:       9/15/2023     4:13 PM 9/18/2023     9:27 AM 8/16/2024     3:08 PM   TANG-7 SCORE   Total Score  13 (moderate anxiety) 6 (mild anxiety)   Total Score 17 13    13 6     ARUNA LEVEL:       No data to display                Preferred Contact:  Need for : No  Preferred Contact: Zita      Type of Contact Today: Face to Face in Clinic    Service Modality: In-person      Data: (subjective / Objective):  Recent ED/IP Admission or Discharge?   None    Patient Goals:  Goal Areas: Health; Mental Health; Financial and Social Service Benefits  Patient stated goals: Health  -consistently see PCP and arrive for scheduled appointments  -take medication consistently / find medications that work best for dx  -work on exercise, going to [facility]    Mental health  -get re-connected with [employer] (Arnoldo)  -get re-connected with [facility] worker (Charli)  -Interested in Writing more    Social Service:   -would like to lead up to getting Targeted Case Management        [facility] Core Service Provided:  Care Coordination: provided care management services/referrals necessary to ensure patient and their identified supports have access to medical, behavioral health, pharmacology and recovery support services.  Ensured that patient's care is integrated across all settings and services.     Current Stressors / Issues / Care Plan Objective Addressed Today:  ABIODUN met with patient and her partner Silviano today in clinic prior to provider visit.   Overall patient doing okay. Really appreciates the food delivery every week. This has been helpful  with food insecurity, and she is satisfied with the food they do get to be able to cook meals at home.     Would like SW to put in a few referral for [facility] worker. SW to do this and notify patient where referrals were placed.     Intervention:  Motivational Interviewing: Expressed Empathy/Understanding   Target Behavior(s): Explored patient's current diet and knowledge of influence on mood    Assessment: (Progress on Goals / Homework):  -[facility]  -Patient will follow up with PCP    Plan: (Homework, other):  Patient was encouraged to continue to seek condition-related information and education.       Carolyn Garcia, Social Work Care Coordinator

## 2024-08-16 NOTE — PROGRESS NOTES
Assessment & Plan     Attention deficit hyperactivity disorder, combined type  Having trouble with medication wearing off in early afternoon. Will keep same XR 20mg dosage, add 5mg IR in the afternoon. May need to go up to 10mg IR depending on symptoms. No undesirable side effects.  - amphetamine-dextroamphetamine (ADDERALL XR) 20 MG 24 hr capsule; Take 1 capsule (20 mg) by mouth daily for 30 days  - amphetamine-dextroamphetamine (ADDERALL XR) 20 MG 24 hr capsule; Take 1 capsule (20 mg) by mouth daily for 30 days  - amphetamine-dextroamphetamine (ADDERALL XR) 20 MG 24 hr capsule; Take 1 capsule (20 mg) by mouth daily for 30 days  - amphetamine-dextroamphetamine (ADDERALL) 5 MG tablet; Take 1 tablet (5 mg) by mouth daily for 30 days  - amphetamine-dextroamphetamine (ADDERALL) 5 MG tablet; Take 1 tablet (5 mg) by mouth daily for 30 days  - amphetamine-dextroamphetamine (ADDERALL) 5 MG tablet; Take 1 tablet (5 mg) by mouth daily for 30 days    Gender dysphoria  Hormone replacement therapy  Erectile dysfunction  Having some hot flashes/temperature dysregulation. Has been taking 6mg estrogen daily. Will trial 3mg BID for more even steady state throughout the day. Also struggling with erectile dysfunction. Has a prescription for viagra but insurance doesn't cover. [employer] coupon sent via email and text to patient.  - spironolactone (ALDACTONE) 100 MG tablet; Take 1 tablet (100 mg) by mouth daily  - estradiol (ESTRACE) 2 MG tablet; Take 1.5 tablets (3 mg) by mouth 2 times daily      Follow up in 1 month to check on ADHD symptoms with medication change.    The longitudinal plan of care for the diagnosis(es)/condition(s) as documented were addressed during this visit. Due to the added complexity in care, I will continue to support Jennifer in the subsequent management and with ongoing continuity of care.      Subjective   Jennifer is a 28 year old, presenting for the following health issues:  Follow Up (Adderall follow up + lab  "work )      8/16/2024     3:13 PM   Additional Questions   Roomed by Davonte   Accompanied by partner         8/16/2024    Information    services provided? No        HPI     ADHD  -no side effects with the adderall  -takes medications between 8am and 10am  -Wearing off around 2-4pm, still has many hours of work left    GAHT  -6mg estradiol  -100mg spironolactone  -getting hot flashes  -trouble with erections, will try viagra      Objective    /77   Pulse 88   Temp 98.3  F (36.8  C) (Oral)   Resp 18   Ht 1.74 m (5' 8.5\")   Wt 110.8 kg (244 lb 3.2 oz)   SpO2 98%   BMI 36.59 kg/m    Body mass index is 36.59 kg/m .  Physical Exam   Constitutional: awake, alert, cooperative, no apparent distress, and appears stated age  Eyes: Lids and lashes normal, pupils equal, round and reactive to light, extra ocular muscles intact, sclera clear, conjunctiva normal  ENT: normocepalic, without obvious abnormality  Hematologic / Lymphatic: no cervical lymphadenopathy and no supraclavicular lymphadenopathy  Respiratory: No increased work of breathing, good air exchange, clear to auscultation bilaterally, no crackles or wheezing  Cardiovascular: Normal apical impulse, regular rate and rhythm, normal S1 and S2, no S3 or S4, and no murmur noted  GI: soft, non-distended and non-tender  Musculoskeletal: no lower extremity pitting edema present  full range of motion noted  tone is normal          Signed Electronically by: Kaur Sanchez MD    Answers submitted by the patient for this visit:  Patient Health Questionnaire (Submitted on 8/16/2024)  If you checked off any problems, how difficult have these problems made it for you to do your work, take care of things at home, or get along with other people?: Somewhat difficult  PHQ9 TOTAL SCORE: 6  TANG-7 (Submitted on 8/16/2024)  TANG 7 TOTAL SCORE: 6    "

## 2024-08-19 NOTE — PATIENT INSTRUCTIONS
Patient Education   Here is the plan from today's visit    1. Attention deficit hyperactivity disorder, combined type  - amphetamine-dextroamphetamine (ADDERALL XR) 20 MG 24 hr capsule; Take 1 capsule (20 mg) by mouth daily for 30 days  Dispense: 30 capsule; Refill: 0  - amphetamine-dextroamphetamine (ADDERALL XR) 20 MG 24 hr capsule; Take 1 capsule (20 mg) by mouth daily for 30 days  Dispense: 30 capsule; Refill: 0  - amphetamine-dextroamphetamine (ADDERALL XR) 20 MG 24 hr capsule; Take 1 capsule (20 mg) by mouth daily for 30 days  Dispense: 30 capsule; Refill: 0  - amphetamine-dextroamphetamine (ADDERALL) 5 MG tablet; Take 1 tablet (5 mg) by mouth daily for 30 days  Dispense: 30 tablet; Refill: 0  - amphetamine-dextroamphetamine (ADDERALL) 5 MG tablet; Take 1 tablet (5 mg) by mouth daily for 30 days  Dispense: 30 tablet; Refill: 0  - amphetamine-dextroamphetamine (ADDERALL) 5 MG tablet; Take 1 tablet (5 mg) by mouth daily for 30 days  Dispense: 30 tablet; Refill: 0    2. Gender dysphoria  - spironolactone (ALDACTONE) 100 MG tablet; Take 1 tablet (100 mg) by mouth daily  Dispense: 90 tablet; Refill: 1  - estradiol (ESTRACE) 2 MG tablet; Take 1.5 tablets (3 mg) by mouth 2 times daily  Dispense: 90 tablet; Refill: 0    3. Hormone replacement therapy  - spironolactone (ALDACTONE) 100 MG tablet; Take 1 tablet (100 mg) by mouth daily  Dispense: 90 tablet; Refill: 1  - estradiol (ESTRACE) 2 MG tablet; Take 1.5 tablets (3 mg) by mouth 2 times daily  Dispense: 90 tablet; Refill: 0      Please call or return to clinic if your symptoms don't go away.    Follow up plan  Return in about 4 weeks (around 9/13/2024).    Thank you for coming to [facility] today.  Lab Testing:  **If you had lab testing today and your results are reassuring or normal they will be mailed to you or sent through [employer] within 7 days.   **If the lab tests need quick action we will call you with the results.  **If you are having labs done on a  different day, please call [phone] to schedule at [facility] or [phone] for other [facility] Outpatient Lab locations. Labs do not offer walk-in appointments.  The phone number we will call with results is # [phone] (home) . If this is not the best number please call our clinic and change the number.  Medication Refills:  If you need any refills please call your pharmacy and they will contact us.   If you need to  your refill at a new pharmacy, please contact the new pharmacy directly. The new pharmacy will help you get your medications transferred faster.   Scheduling:  If you have any concerns about today's visit or wish to schedule another appointment please call our office during normal business hours [phone] (8-5:00 M-F). If you can no longer make a scheduled visit, please cancel via [employer] or call us to cancel.   If a referral was made to an [facility] specialty provider and you do not get a call from central scheduling, please refer to directions on your visit summary or call our office during normal business hours for assistance.   If a Mammogram was ordered for you at the Breast Center call [phone] to schedule or change your appointment.  If you had an XRay/CT/Ultrasound/MRI ordered the number is [phone] to schedule or change your radiology appointment.   [facility] has limited ultrasound appointments available on Wednesdays, if you would like your ultrasound at [facility], please call [phone] to schedule.   Medical Concerns:  If you have urgent medical concerns please call [phone] at any time of the day.    Kaur Sanchez MD

## 2024-08-20 LAB — TESTOST SERPL-MCNC: 216 NG/DL (ref 8–950)

## 2024-08-22 ENCOUNTER — TELEPHONE (OUTPATIENT)
Dept: FAMILY MEDICINE | Facility: CLINIC | Age: 29
End: 2024-08-22
Payer: COMMERCIAL

## 2024-08-22 DIAGNOSIS — N52.9 ERECTILE DYSFUNCTION, UNSPECIFIED ERECTILE DYSFUNCTION TYPE: Primary | ICD-10-CM

## 2024-08-22 NOTE — TELEPHONE ENCOUNTER
[facility] phone call message- medication clarification/question:    Full Medication Name: Sildenafil    Question: Patient called SW today. Stated she went to the pharmacy hoping to  a RX for Sildenafil. Patient states pharmacy does not have RX for this. Patient states this was discussed with provider and they had a goodRX coupon for it. Would like to talk further about it and getting prescription.     Pharmacy confirmed as [id number] - [place], [place] - [address] AT [facility] [address]: Yes    OK to leave a message on voice mail? Yes    Primary language: English      needed? No    Call taken on August 22, 2024 at 3:03 PM by Carolyn Garcia

## 2024-08-23 RX ORDER — SILDENAFIL 100 MG/1
TABLET, FILM COATED ORAL
Qty: 30 TABLET | Refills: 2 | Status: SHIPPED | OUTPATIENT
Start: 2024-08-23 | End: 2024-09-30

## 2024-08-30 ENCOUNTER — TELEPHONE (OUTPATIENT)
Dept: FAMILY MEDICINE | Facility: CLINIC | Age: 29
End: 2024-08-30
Payer: COMMERCIAL

## 2024-08-30 NOTE — TELEPHONE ENCOUNTER
"Care Coordinator received a message from DANYELLE De La Cruz today stating that \"patient would like a call back to schedule the most updated COVID vaccine\". CC reached out to Zaira at the /Call Center to get the latest on scheduling these vaccines. \"At this time we did not have COVID vaccines in clinic\". \"A new formula is coming this fall, we have not received it yet\".  CC reached out to patient to inform her of that we will be getting the updated vaccine this fall and to call us back middle to late September to see about scheduling. Patient has clinic number and will call back in September.      Marisela Rogers  Lead Care Coordinator  [facility]  [phone]        "

## 2024-09-25 ENCOUNTER — CARE COORDINATION (OUTPATIENT)
Dept: PSYCHOLOGY | Facility: CLINIC | Age: 29
End: 2024-09-25
Payer: COMMERCIAL

## 2024-09-25 NOTE — PROGRESS NOTES
Behavioral Health Home Services  [facility]: [place]        Social Work Care Navigator Note      Patient: Sada Paredes  Date: September 25, 2024  Preferred Name: Jennifer    Previous PHQ-9:       3/26/2024     3:47 PM 5/3/2024     1:45 PM 8/16/2024     3:08 PM   PHQ-9 SCORE   PHQ-9 Total Score MyChart 5 (Mild depression) 5 (Mild depression) 6 (Mild depression)   PHQ-9 Total Score 5 5 6     Previous TANG-7:       9/15/2023     4:13 PM 9/18/2023     9:27 AM 8/16/2024     3:08 PM   TANG-7 SCORE   Total Score  13 (moderate anxiety) 6 (mild anxiety)   Total Score 17 13    13 6     ARUNA LEVEL:       No data to display                Preferred Contact:  Need for : No  Preferred Contact: Zita      Type of Contact Today: Phone call (patient / identified key support person reached)    Service Modality: Phone Visit:      Provider verified identity through the following two step process.  Patient provided:  Patient is known previously to provider    Telephone Visit: The patient's condition can be safely assessed and treated via synchronous audio telemedicine encounter.      Consent has been obtained for this service by care team member: Yes       Data: (subjective / Objective):  Recent ED/IP Admission or Discharge?   None    Patient Goals:  Goal Areas: Health; Mental Health; Financial and Social Service Benefits  Patient stated goals: Health  -consistently see PCP and arrive for scheduled appointments  -take medication consistently / find medications that work best for dx  -work on exercise, going to [facility]    Mental health  -get re-connected with [employer] [facility] (Arnoldo)  -get re-connected with [facility] worker (Charli)  -Interested in Writing more    Social Service:   -would like to lead up to getting Targeted Case Management        [facility] Core Service Provided:  Health and Wellness Promotion    Current Stressors / Issues / Care Plan Objective Addressed Today:  SW connected with patient today via phone. SW provided  appointment reminder for PCP visit 9/30. Patient due for HAP, will meet with patient following provider visit. Let patient know that we do have covid19 vaccines available now and patient could get this at appt on Monday.     Intervention:  Motivational Interviewing: Expressed Empathy/Understanding and Open-ended questions       Assessment: (Progress on Goals / Homework):  Patient will arrive for scheduled appointment. HAP will be completed.    Plan: (Homework, other):  Patient was encouraged to continue to seek condition-related information and education.      Scheduled a Clinic follow up appointment with PCP in 1 week     Patient has set self-identified goals and will monitor progress until the next appointment on: 09/30/2024.     Carolyn Garcia, Social Work Care Coordinator

## 2024-09-30 ENCOUNTER — CARE COORDINATION (OUTPATIENT)
Dept: PSYCHOLOGY | Facility: CLINIC | Age: 29
End: 2024-09-30

## 2024-09-30 ENCOUNTER — OFFICE VISIT (OUTPATIENT)
Dept: FAMILY MEDICINE | Facility: CLINIC | Age: 29
End: 2024-09-30
Payer: COMMERCIAL

## 2024-09-30 VITALS
HEIGHT: 69 IN | BODY MASS INDEX: 37.03 KG/M2 | HEART RATE: 84 BPM | SYSTOLIC BLOOD PRESSURE: 104 MMHG | OXYGEN SATURATION: 99 % | DIASTOLIC BLOOD PRESSURE: 66 MMHG | TEMPERATURE: 98.4 F | RESPIRATION RATE: 12 BRPM | WEIGHT: 250 LBS

## 2024-09-30 DIAGNOSIS — N52.9 ERECTILE DYSFUNCTION, UNSPECIFIED ERECTILE DYSFUNCTION TYPE: ICD-10-CM

## 2024-09-30 DIAGNOSIS — F90.2 ATTENTION DEFICIT HYPERACTIVITY DISORDER, COMBINED TYPE: Primary | ICD-10-CM

## 2024-09-30 DIAGNOSIS — F64.9 GENDER DYSPHORIA: ICD-10-CM

## 2024-09-30 PROCEDURE — 90480 ADMN SARSCOV2 VAC 1/ONLY CMP: CPT

## 2024-09-30 PROCEDURE — 90656 IIV3 VACC NO PRSV 0.5 ML IM: CPT

## 2024-09-30 PROCEDURE — 99214 OFFICE O/P EST MOD 30 MIN: CPT | Mod: 25

## 2024-09-30 PROCEDURE — 90471 IMMUNIZATION ADMIN: CPT

## 2024-09-30 PROCEDURE — 91320 SARSCV2 VAC 30MCG TRS-SUC IM: CPT

## 2024-09-30 RX ORDER — DEXTROAMPHETAMINE SACCHARATE, AMPHETAMINE ASPARTATE, DEXTROAMPHETAMINE SULFATE AND AMPHETAMINE SULFATE 2.5; 2.5; 2.5; 2.5 MG/1; MG/1; MG/1; MG/1
10 TABLET ORAL DAILY
Qty: 30 TABLET | Refills: 0 | Status: SHIPPED | OUTPATIENT
Start: 2024-10-30 | End: 2024-11-29

## 2024-09-30 RX ORDER — SILDENAFIL 100 MG/1
TABLET, FILM COATED ORAL
Qty: 30 TABLET | Refills: 2 | Status: SHIPPED | OUTPATIENT
Start: 2024-09-30

## 2024-09-30 RX ORDER — DEXTROAMPHETAMINE SACCHARATE, AMPHETAMINE ASPARTATE, DEXTROAMPHETAMINE SULFATE AND AMPHETAMINE SULFATE 2.5; 2.5; 2.5; 2.5 MG/1; MG/1; MG/1; MG/1
10 TABLET ORAL DAILY
Qty: 30 TABLET | Refills: 0 | Status: SHIPPED | OUTPATIENT
Start: 2024-09-30 | End: 2024-10-30

## 2024-09-30 RX ORDER — DEXTROAMPHETAMINE SACCHARATE, AMPHETAMINE ASPARTATE, DEXTROAMPHETAMINE SULFATE AND AMPHETAMINE SULFATE 2.5; 2.5; 2.5; 2.5 MG/1; MG/1; MG/1; MG/1
10 TABLET ORAL DAILY
Qty: 30 TABLET | Refills: 0 | Status: SHIPPED | OUTPATIENT
Start: 2024-11-29 | End: 2024-12-29

## 2024-09-30 ASSESSMENT — PATIENT HEALTH QUESTIONNAIRE - PHQ9: SUM OF ALL RESPONSES TO PHQ QUESTIONS 1-9: 4

## 2024-09-30 NOTE — PROGRESS NOTES
Preceptor Attestation:    I discussed the patient with the resident and evaluated the patient in person. I have verified the content of the note, which accurately reflects my assessment of the patient and the plan of care.   Supervising Physician:  Olga Juarez MD.

## 2024-09-30 NOTE — PROGRESS NOTES
Assessment & Plan     Attention deficit hyperactivity disorder, combined type  Having good concentration and focus in the morning, 5 mg of IR Adderall has improved concentration and focus in the afternoon but still feeling like she is experiencing some brain fog and decreased concentration in the afternoon.  Increase afternoon dose to 10 mg IR.  - amphetamine-dextroamphetamine (ADDERALL) 10 MG tablet; Take 1 tablet (10 mg) by mouth daily.  - amphetamine-dextroamphetamine (ADDERALL) 10 MG tablet; Take 1 tablet (10 mg) by mouth daily.  - amphetamine-dextroamphetamine (ADDERALL) 10 MG tablet; Take 1 tablet (10 mg) by mouth daily.    Erectile dysfunction, unspecified erectile dysfunction type  Patient reports pharmacy said they did not receive original prescription.  Prescription resent.  - sildenafil (VIAGRA) 100 MG tablet; Take half tablet. Ok to take 100mg if erectile prescription persists    Gender dysphoria  Taking estradiol 3 mg twice daily, going well, no concerns.  Reviewed last labs which showed low estradiol at 73, nonsuppressed testosterone at 213.  Patient feels well and there are body and would like to continue on current dosing.  No concerns for bone health with current or mild levels.  -Continue estradiol 3 mg twice daily, spironolactone      Return in about 6 months (around 3/30/2025).    The longitudinal plan of care for the diagnosis(es)/condition(s) as documented were addressed during this visit. Due to the added complexity in care, I will continue to support Jennifer in the subsequent management and with ongoing continuity of care.      Subjective   Jennifer is a 28 year old, presenting for the following health issues:  RECHECK and OTHER (Discuss adderall dose)        9/30/2024     3:24 PM   Additional Questions   Roomed by Ohn   Accompanied by partner         9/30/2024    Information    services provided? No        HPI     Tried to pic  up her sildenefil but golden said it wasn't  "there    Noticed some improvement with the 5mg in the afternoon but still having some trouble with focus, brain fog. Taking it around 3ish pm.  No problems with sleep, no problems with appetite    Taking estradiol mostly BID which has been helping with the afternoon temperature regulation.       Objective    /66   Pulse 84   Temp 98.4  F (36.9  C) (Oral)   Resp 12   Ht 1.74 m (5' 8.5\")   Wt 113.4 kg (250 lb)   SpO2 99%   BMI 37.46 kg/m    Body mass index is 37.46 kg/m .  Physical Exam  Constitutional:       General: She is not in acute distress.     Appearance: Normal appearance. She is normal weight.   HENT:      Head: Normocephalic and atraumatic.      Mouth/Throat:      Mouth: Mucous membranes are moist.      Pharynx: Oropharynx is clear.   Eyes:      Extraocular Movements: Extraocular movements intact.      Pupils: Pupils are equal, round, and reactive to light.   Cardiovascular:      Rate and Rhythm: Normal rate and regular rhythm.      Pulses: Normal pulses.      Heart sounds: Normal heart sounds.   Pulmonary:      Effort: Pulmonary effort is normal.      Breath sounds: Normal breath sounds.   Abdominal:      General: Abdomen is flat. There is no distension.      Palpations: Abdomen is soft.      Tenderness: There is no abdominal tenderness.   Musculoskeletal:         General: No swelling. Normal range of motion.      Cervical back: Normal range of motion and neck supple. No rigidity or tenderness.   Lymphadenopathy:      Cervical: No cervical adenopathy.   Skin:     General: Skin is warm and dry.   Neurological:      General: No focal deficit present.      Mental Status: She is alert and oriented to person, place, and time.   Psychiatric:         Mood and Affect: Mood normal.         Thought Content: Thought content normal.                Signed Electronically by: Kaur Sanchez MD    "

## 2024-09-30 NOTE — PATIENT INSTRUCTIONS
Patient Education   Here is the plan from today's visit    1. Erectile dysfunction, unspecified erectile dysfunction type  - sildenafil (VIAGRA) 100 MG tablet; Take half tablet. Ok to take 100mg if erectile prescription persists  Dispense: 30 tablet; Refill: 2    2. Attention deficit hyperactivity disorder, combined type  Take 20mg XR in the morning and 10mg IR adderall in the early  afternoon.  - amphetamine-dextroamphetamine (ADDERALL) 10 MG tablet; Take 1 tablet (10 mg) by mouth daily.  Dispense: 30 tablet; Refill: 0  - amphetamine-dextroamphetamine (ADDERALL) 10 MG tablet; Take 1 tablet (10 mg) by mouth daily.  Dispense: 30 tablet; Refill: 0  - amphetamine-dextroamphetamine (ADDERALL) 10 MG tablet; Take 1 tablet (10 mg) by mouth daily.  Dispense: 30 tablet; Refill: 0    Please call or return to clinic if your symptoms don't go away.    Follow up plan  Return in about 6 months (around 3/30/2025).    Thank you for coming to [facility] today.  Lab Testing:  **If you had lab testing today and your results are reassuring or normal they will be mailed to you or sent through [employer] within 7 days.   **If the lab tests need quick action we will call you with the results.  **If you are having labs done on a different day, please call [phone] to schedule at [facility] or [phone] for other [facility] Outpatient Lab locations. Labs do not offer walk-in appointments.  The phone number we will call with results is # [phone] (home) . If this is not the best number please call our clinic and change the number.  Medication Refills:  If you need any refills please call your pharmacy and they will contact us.   If you need to  your refill at a new pharmacy, please contact the new pharmacy directly. The new pharmacy will help you get your medications transferred faster.   Scheduling:  If you have any concerns about today's visit or wish to schedule another appointment please call our office during  normal business hours [phone] (8-5:00 M-F). If you can no longer make a scheduled visit, please cancel via [employer] [place] or call us to cancel.   If a referral was made to an [facility] specialty provider and you do not get a call from central scheduling, please refer to directions on your visit summary or call our office during normal business hours for assistance.   If a Mammogram was ordered for you at the Breast Center call [phone] to schedule or change your appointment.  If you had an XRay/CT/Ultrasound/MRI ordered the number is [phone] to schedule or change your radiology appointment.   [facility] has limited ultrasound appointments available on Wednesdays, if you would like your ultrasound at [facility], please call [phone] to schedule.   Medical Concerns:  If you have urgent medical concerns please call [phone] at any time of the day.    Kaur Sanchez MD

## 2024-10-01 NOTE — PROGRESS NOTES
Behavioral Health Home Services  [facility]: [place]        Social Work Care Navigator Note      Patient: Sada Paredes  Date: October 1, 2024  Preferred Name: Jennifer    Previous PHQ-9:       5/3/2024     1:45 PM 8/16/2024     3:08 PM 9/30/2024     3:26 PM   PHQ-9 SCORE   PHQ-9 Total Score MyChart 5 (Mild depression) 6 (Mild depression)    PHQ-9 Total Score 5 6 4     Previous TANG-7:       9/15/2023     4:13 PM 9/18/2023     9:27 AM 8/16/2024     3:08 PM   TANG-7 SCORE   Total Score  13 (moderate anxiety) 6 (mild anxiety)   Total Score 17 13    13 6     ARUNA LEVEL:       No data to display                Preferred Contact:  Need for : No  Preferred Contact: Zita      Type of Contact Today: Face to Face in Clinic    Service Modality: In-person      Data: (subjective / Objective):    Patient came in to complete the comprehensive wellness assessment for Behavioral Health Home Services.  See [facility] Flowsheets for details on the assessment.  See [facility] Home for a copy of the patient's care plan.    Health:   -Jennifer will maintain 6 month follow up appointments with primary care provider  -Jennifer will get established with a dentist    Mental Health:  -Jennifer will call [facility] [employer] [facility] to get re-connected with therapist and MAKR Garcia Social Work Care Coordinator

## 2024-10-09 ENCOUNTER — CARE COORDINATION (OUTPATIENT)
Dept: PSYCHOLOGY | Facility: CLINIC | Age: 29
End: 2024-10-09
Payer: COMMERCIAL
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
## 2024-11-18 ENCOUNTER — CARE COORDINATION (OUTPATIENT)
Dept: PSYCHOLOGY | Facility: CLINIC | Age: 29
End: 2024-11-18
Payer: COMMERCIAL

## 2024-11-18 NOTE — PROGRESS NOTES
"Behavioral Health Home Services  [facility]: [place]    Community Health Worker Note    Patient: Sada Paredes  Date: November 18, 2024  Preferred Name: Jennifer    Previous PHQ-9:       5/3/2024     1:45 PM 8/16/2024     3:08 PM 9/30/2024     3:26 PM   PHQ-9 SCORE   PHQ-9 Total Score MyChart 5 (Mild depression) 6 (Mild depression)    PHQ-9 Total Score 5 6 4     Previous TANG-7:       9/15/2023     4:13 PM 9/18/2023     9:27 AM 8/16/2024     3:08 PM   TANG-7 SCORE   Total Score  13 (moderate anxiety) 6 (mild anxiety)   Total Score 17 13    13 6     ARUNA LEVEL:       No data to display                Preferred Contact:  Need for : No  Preferred Contact: Zita      Type of Contact Today: Phone call (patient / identified key support person reached)    Service Modality: Phone Visit:      Provider verified identity through the following two step process.  Patient provided:  Patient is known previously to provider    Telephone Visit: The patient's condition can be safely assessed and treated via synchronous audio telemedicine encounter.      Reason for Audio Telemedicine Visit: Patient convenience (e.g. access to timely appointments / distance to available provider)    Consent:  The patient/guardian has verbally consented to:     1. The potential risks and benefits of telemedicine (telephone visit) versus in person care;    The patient has been notified of the following:      \"We have found that certain health care needs can be provided without the need for a face to face visit.  This service lets us provide the care you need with a phone conversation.       I will have full access to your [facility] medical record during this entire phone call.   I will be taking notes for your medical record.      Since this is like an office visit, we will bill your insurance company for this service.       There are potential benefits and risks of telephone visits (e.g. limits to patient confidentiality) that differ " "from in-person visits.?Confidentiality still applies for telephone services, and nobody will record the visit.  It is important to be in a quiet, private space that is free of distractions (including cell phone or other devices) during the visit.??      If during the course of the call I believe a telephone visit is not appropriate, you will not be charged for this service\"     Consent has been obtained for this service by care team member: Yes     Data: (Subjective / Objective):  Recent ED/IP Admission or Discharge?   None    Patient Goals:  Goal Areas: Health; Mental Health  Patient stated goals: Health:   -Jennifer will maintain 6 month follow up appointments with primary care provider  -Jennifer will get established with a dentist    Mental Health:  -Jennifer will call [facility] to get [facility] Core Service Provided:  Care Coordination: provided care management services/referrals necessary to ensure patient and their identified supports have access to medical, behavioral health, pharmacology and recovery support services.  Ensured that patient's care is integrated across all settings and services.     Current Reported Stressors / Issues / Health Action Plan Items Addressed Today:  CHW telephone outreach to Jennifer.     Jennifer states she is doing well. Currently has no new concerns for [facility] team. Reminded Jennifer that CHW is able to meet them out in the community/home as well as clinic. If she does think of anything she needs support with  Jennifer will call back.    Plan: (Homework, other):  Patient was encouraged to continue to seek condition-related information and education.      Patient has set self-identified goals and will monitor progress until the next appointment.   Dorothy Greenberg, Community Health Worker       Community Health Worker has reviewed upcoming appointments with patient. Encouraged attendance.      Future Appointments 11/18/2024 - 5/17/2025      Mary Ann De La Cruz "   Community Health Worker  November 18, 2024

## 2024-12-11 ENCOUNTER — CARE COORDINATION (OUTPATIENT)
Dept: PSYCHOLOGY | Facility: CLINIC | Age: 29
End: 2024-12-11
Payer: COMMERCIAL

## 2024-12-11 NOTE — PROGRESS NOTES
Behavioral Health Home Services  [facility] Clinic: [place]    Community Health Worker Note    Patient: Sada Paredes  Date: December 11, 2024  Preferred Name: Jennifer    Previous PHQ-9:       5/3/2024     1:45 PM 8/16/2024     3:08 PM 9/30/2024     3:26 PM   PHQ-9 SCORE   PHQ-9 Total Score MyChart 5 (Mild depression) 6 (Mild depression)    PHQ-9 Total Score 5 6 4     Previous TANG-7:       9/15/2023     4:13 PM 9/18/2023     9:27 AM 8/16/2024     3:08 PM   TANG-7 SCORE   Total Score  13 (moderate anxiety) 6 (mild anxiety)   Total Score 17 13    13 6     ARUNA LEVEL:       No data to display                Preferred Contact:  Need for : No  Preferred Contact: Zita      Type of Contact Today: Phone call (not reached/unavailable)    Service Modality: Phone Visit:      Provider verified identity through the following two step process.  Patient provided:  Patient is known previously to provider    Telephone Visit: The patient's condition can be safely assessed and treated via synchronous audio telemedicine encounter.      Reason for Audio Telemedicine Visit: Patient convenience (e.g. access to timely appointments / distance to available provider)      Data: (Subjective / Objective):  Attempted to reach patient, but was unsuccessful.  Plan to attempt again.  Dorothy Greenberg    Community Health Worker has reviewed upcoming appointments with patient. Encouraged attendance.      Future Appointments 12/11/2024 - 6/9/2025      None              Dorothy Greenberg  Community Health Worker  December 11, 2024

## 2024-12-15 ENCOUNTER — HEALTH MAINTENANCE LETTER (OUTPATIENT)
Age: 29
End: 2024-12-15

## 2024-12-19 ENCOUNTER — CARE COORDINATION (OUTPATIENT)
Dept: PSYCHOLOGY | Facility: CLINIC | Age: 29
End: 2024-12-19
Payer: COMMERCIAL

## 2024-12-19 NOTE — PROGRESS NOTES
Behavioral Health Home Services  [facility]: [place]    Community Health Worker Note    Patient: Sada Paredes  Date: December 19, 2024  Preferred Name: Jennifer    Previous PHQ-9:       5/3/2024     1:45 PM 8/16/2024     3:08 PM 9/30/2024     3:26 PM   PHQ-9 SCORE   PHQ-9 Total Score MyChart 5 (Mild depression) 6 (Mild depression)    PHQ-9 Total Score 5 6 4     Previous TANG-7:       9/15/2023     4:13 PM 9/18/2023     9:27 AM 8/16/2024     3:08 PM   TANG-7 SCORE   Total Score  13 (moderate anxiety) 6 (mild anxiety)   Total Score 17 13    13 6     ARUNA LEVEL:       No data to display                Preferred Contact:  Need for : No  Preferred Contact: Zita      Type of Contact Today: Phone call (not reached/unavailable)    Service Modality: Phone Visit:      Provider verified identity through the following two step process.  Patient provided:  Patient is known previously to provider    Telephone Visit: The patient's condition can be safely assessed and treated via synchronous audio telemedicine encounter.      Reason for Audio Telemedicine Visit: Patient convenience (e.g. access to timely appointments / distance to available provider)      Data: (Subjective / Objective):  Attempted to reach patient, but was unsuccessful.  Plan to attempt again.  Dorothy Greenberg    Community Health Worker has reviewed upcoming appointments with patient. Encouraged attendance.      Future Appointments 12/19/2024 - 6/17/2025      None              Dorothy Greenberg  Community Health Worker  December 19, 2024

## 2025-01-09 ENCOUNTER — CARE COORDINATION (OUTPATIENT)
Dept: PSYCHOLOGY | Facility: CLINIC | Age: 30
End: 2025-01-09
Payer: COMMERCIAL

## 2025-01-09 NOTE — PROGRESS NOTES
Behavioral Health Home Services  [facility]: [place]    Community Health Worker Note    Patient: Sada Paredes  Date: January 9, 2025  Preferred Name: Jennifer    Previous PHQ-9:       5/3/2024     1:45 PM 8/16/2024     3:08 PM 9/30/2024     3:26 PM   PHQ-9 SCORE   PHQ-9 Total Score MyChart 5 (Mild depression) 6 (Mild depression)    PHQ-9 Total Score 5 6 4     Previous TANG-7:       9/15/2023     4:13 PM 9/18/2023     9:27 AM 8/16/2024     3:08 PM   TANG-7 SCORE   Total Score  13 (moderate anxiety) 6 (mild anxiety)   Total Score 17 13    13 6     ARUNA LEVEL:       No data to display                Preferred Contact:  Need for : No  Preferred Contact: Zita      Type of Contact Today: Phone call (not reached/unavailable)    Service Modality: Phone Visit:      Provider verified identity through the following two step process.  Patient provided:  Patient is known previously to provider    Telephone Visit: The patient's condition can be safely assessed and treated via synchronous audio telemedicine encounter.      Reason for Audio Telemedicine Visit: Patient convenience (e.g. access to timely appointments / distance to available provider)      Data: (Subjective / Objective):  Attempted to reach patient, but was unsuccessful.  Plan to attempt again.  Calling for check in and to schedule follow up with PCP as schedule is open for March. Dorothy Greenberg    Community Health Worker has reviewed upcoming appointments with patient. Encouraged attendance.      Future Appointments 1/9/2025 - 7/8/2025      None              Dorothy Greenberg  Community Health Worker  January 9, 2025

## 2025-01-16 ENCOUNTER — CARE COORDINATION (OUTPATIENT)
Dept: PSYCHOLOGY | Facility: CLINIC | Age: 30
End: 2025-01-16
Payer: COMMERCIAL

## 2025-01-16 NOTE — PROGRESS NOTES
Behavioral Health Home Services  [facility]: [place]    Community Health Worker Note    Patient: Sada Paredes  Date: January 16, 2025  Preferred Name: Jennifer    Previous PHQ-9:       5/3/2024     1:45 PM 8/16/2024     3:08 PM 9/30/2024     3:26 PM   PHQ-9 SCORE   PHQ-9 Total Score MyChart 5 (Mild depression) 6 (Mild depression)    PHQ-9 Total Score 5 6 4     Previous TANG-7:       9/15/2023     4:13 PM 9/18/2023     9:27 AM 8/16/2024     3:08 PM   TANG-7 SCORE   Total Score  13 (moderate anxiety) 6 (mild anxiety)   Total Score 17 13    13 6     ARUNA LEVEL:       No data to display                Preferred Contact:  Need for : No  Preferred Contact: Zita      Type of Contact Today: Face to Face in Home / Community    Service Modality: In-person    Data: (Subjective / Objective):  Recent ED/IP Admission or Discharge?   None    Patient Goals:  Goal Areas: Health; Mental Health  Patient stated goals: Health:   -Jennifer will maintain 6 month follow up appointments with primary care provider  -Jennifer will get established with a dentist    Mental Health:  -Jennifer will call [facility] to get re-connected with therapist and [facility] Core Service Provided:  Care Coordination: provided care management services/referrals necessary to ensure patient and their identified supports have access to medical, behavioral health, pharmacology and recovery support services.  Ensured that patient's care is integrated across all settings and services.     Current Reported Stressors / Issues / Health Action Plan Items Addressed Today:  CHW community visit with Jennifer and their spouse Silviano (Both enrolled in [facility].)     We met at the [facility] in [place]. Jennifer stated things have been going well, she currently has no new concerns for [facility].         Plan: (Homework, other):  Patient was encouraged to continue to seek condition-related information and education.        Patient has set self-identified  goals and will monitor progress until the next appointment.   Dorothy Greenberg Community Health Worker       Community Health Worker has reviewed upcoming appointments with patient. Encouraged attendance.      Future Appointments 1/16/2025 - 7/15/2025      None              Dorothy Greenberg  Community Health Worker  January 16, 2025

## 2025-02-03 ENCOUNTER — CARE COORDINATION (OUTPATIENT)
Dept: PSYCHOLOGY | Facility: CLINIC | Age: 30
End: 2025-02-03
Payer: COMMERCIAL

## 2025-02-03 NOTE — PROGRESS NOTES
Behavioral Health Home Services  [facility]: [place]        Social Work Care Navigator Note      Patient: Sada Paredes  Date: February 3, 2025  Preferred Name: Jennifer    Previous PHQ-9:       5/3/2024     1:45 PM 8/16/2024     3:08 PM 9/30/2024     3:26 PM   PHQ-9 SCORE   PHQ-9 Total Score MyChart 5 (Mild depression) 6 (Mild depression)    PHQ-9 Total Score 5 6 4     Previous TANG-7:       9/15/2023     4:13 PM 9/18/2023     9:27 AM 8/16/2024     3:08 PM   TANG-7 SCORE   Total Score  13 (moderate anxiety) 6 (mild anxiety)   Total Score 17 13    13 6     ARUNA LEVEL:       No data to display                Preferred Contact:  Need for : No  Preferred Contact: Zita      Type of Contact Today: Face to Face in Clinic    Service Modality: In-person      Data: (subjective / Objective):  Recent ED/IP Admission or Discharge?   None    Patient Goals:  Goal Areas: Health; Mental Health  Patient stated goals: Health:   -Jennifer will maintain 6 month follow up appointments with primary care provider  -Jennifer will get established with a dentist    Mental Health:  -Jennifer will call [facility] to get re-connected with therapist and [facility] Core Service Provided:  Care Coordination: provided care management services/referrals necessary to ensure patient and their identified supports have access to medical, behavioral health, pharmacology and recovery support services.  Ensured that patient's care is integrated across all settings and services.   Individual and Family Support: aimed to help clients reduce barriers to achieving goals, increase health literacy and knowledge about chronic condition(s), increase self-efficacy skills, and improve health outcomes    Current Stressors / Issues / Care Plan Objective Addressed Today:  SW met with patient today in clinic.     ABIODUN assisted with scheduling patient with 1 month follow up appointment with PCP.     Patient interested in getting back on list for [facility] when  it starts up again in the spring.     Patient would like assistance getting connected with a therapist. SW to put in a few referrals online for patient.     Intervention:  Motivational Interviewing: Expressed Empathy/Understanding and Open-ended questions   Target Behavior(s): Explored thoughts and readiness to participate in individual therapy    Assessment: (Progress on Goals / Homework):  Patient will maintain communication with care team and [facility] team. Patient will arrive for scheduled visits. Patient will get connected with MH therapy.     Plan: (Homework, other):  Patient was encouraged to continue to seek condition-related information and education.      Scheduled a Clinic follow up appointment with PCP in 4 weeks     Patient has set self-identified goals and will monitor progress until the next appointment on: 03/05/2025.     Carolyn Garcia, Social Work Care Coordinator

## 2025-02-06 ENCOUNTER — CARE COORDINATION (OUTPATIENT)
Dept: PSYCHOLOGY | Facility: CLINIC | Age: 30
End: 2025-02-06
Payer: COMMERCIAL

## 2025-02-06 NOTE — PROGRESS NOTES
"Behavioral Health Home Services  [facility]: [place]    Community Health Worker Note    Patient: Sada Paredes  Date: February 6, 2025  Preferred Name: Jennifer    Previous PHQ-9:       5/3/2024     1:45 PM 8/16/2024     3:08 PM 9/30/2024     3:26 PM   PHQ-9 SCORE   PHQ-9 Total Score MyChart 5 (Mild depression) 6 (Mild depression)    PHQ-9 Total Score 5 6 4     Previous TANG-7:       9/15/2023     4:13 PM 9/18/2023     9:27 AM 8/16/2024     3:08 PM   TANG-7 SCORE   Total Score  13 (moderate anxiety) 6 (mild anxiety)   Total Score 17 13    13 6     ARUNA LEVEL:       No data to display                Preferred Contact:  Need for : No  Preferred Contact: Zita      Type of Contact Today: Phone call (patient / identified key support person reached)    Service Modality: Phone Visit:      Provider verified identity through the following two step process.  Patient provided:  Patient is known previously to provider    Telephone Visit: The patient's condition can be safely assessed and treated via synchronous audio telemedicine encounter.      Reason for Audio Telemedicine Visit: Patient convenience (e.g. access to timely appointments / distance to available provider)        Consent:  The patient/guardian has verbally consented to:     1. The potential risks and benefits of telemedicine (telephone visit) versus in person care;    The patient has been notified of the following:      \"We have found that certain health care needs can be provided without the need for a face to face visit.  This service lets us provide the care you need with a phone conversation.       I will have full access to your [facility] medical record during this entire phone call.  I will be taking notes for your medical record.      Since this is like an office visit, we will bill your insurance company for this service.       There are potential benefits and risks of telephone visits (e.g. limits to patient confidentiality) that " "differ from in-person visits.?Confidentiality still applies for telephone services, and nobody will record the visit.  It is important to be in a quiet, private space that is free of distractions (including cell phone or other devices) during the visit.??      If during the course of the call I believe a telephone visit is not appropriate, you will not be charged for this service\"     Consent has been obtained for this service by care team member: Yes     Data: (Subjective / Objective):  Recent ED/IP Admission or Discharge?   None    Patient Goals:  Goal Areas: Health; Mental Health  Patient stated goals: Health:   -Jennifer will maintain 6 month follow up appointments with primary care provider  -Jennifer will get established with a dentist    Mental Health:  -Jennifer will call [facility] to get re-connected with therapist and [facility] Core Service Provided:  Care Coordination: provided care management services/referrals necessary to ensure patient and their identified supports have access to medical, behavioral health, pharmacology and recovery support services.  Ensured that patient's care is integrated across all settings and services.     Current Reported Stressors / Issues / Health Action Plan Items Addressed Today:  Incoming call to CHW from Jennifer's spouse Silviano.     Silviano was requesting a referral for [facility] services for Jennifer.     CHW completed online referral for [facility] services at [facility].     Plan: (Homework, other):  Patient was encouraged to continue to seek condition-related information and education.      Patient has set self-identified goals and will monitor progress until the next appointment on: 03/05/25.     Dorothy Greenberg, Community Health Worker       Community Health Worker has reviewed upcoming appointments with patient. Encouraged attendance.      Future Appointments 2/6/2025 - 8/5/2025        Date Visit Type Length Department Provider     3/5/2025  2:20 PM [facility] RETURN 20 min [facility] " MEDICINE Kaur Sanchez MD    Location Instructions:     [facility]ibe is in [address] at [address] in [place]. This is at the [place]; corner of the intersection of [address] and along the [address], one mile south of [place] [address]. Free lot parking is available.                        Dorothy Greenberg  Community Health Worker  February 6, 2025

## 2025-02-12 ENCOUNTER — TELEPHONE (OUTPATIENT)
Dept: FAMILY MEDICINE | Facility: CLINIC | Age: 30
End: 2025-02-12
Payer: COMMERCIAL

## 2025-02-12 ENCOUNTER — CARE COORDINATION (OUTPATIENT)
Dept: PSYCHOLOGY | Facility: CLINIC | Age: 30
End: 2025-02-12
Payer: COMMERCIAL

## 2025-02-12 NOTE — PROGRESS NOTES
Behavioral Health Home Services  Type of Contact: Phone call (patient / identified key support person reached)  Carolyn Garcia Social Work Care Coordinator    Care Coordination: provided care management services/referrals necessary to ensure patient and their identified supports have access to medical, behavioral health, pharmacology and recovery support services.  Ensured that patient's care is integrated across all settings and services.     SW connected with patient today. Reviewed that patient is due for HAP update with [facility] team. Scheduled with [facility] SW on 3/05 following provider visit in clinic.     MARYAM Hayes  Behavioral Health Home- Social Work Care Coordinator

## 2025-02-12 NOTE — TELEPHONE ENCOUNTER
[facility] phone call message-patient reporting a symptom:     Symptom: Right Nasal passage blockage    Additional comments: ABIODUN CC spoke with patient today via phone. Patient reports having a recent dentist appointment. At that time they did a 3D scan to check for cavities. The dentist reported they noticed a blockage in patient's right nasal passage, where it should usually be open. Encouraged patient to follow up with PCP about this.     Patient reports no pain, but feeling a little worried about it. Wanted to know if she is ok with waiting until 3/5 appt with PCP to review or if she should be seen sooner. Notified patient SW would route to triage team for further review.     OK to leave message on voice mail? Yes    Primary language: English      needed? No    Call taken on February 12, 2025 at 2:06 PM by Carolyn Garcia

## 2025-02-13 NOTE — TELEPHONE ENCOUNTER
ABIODUN CC reviewed encounter from PCP and triage team. Patient ok to wait until 3/5 appt to discuss nasal passage. SW called patient and had to leave a voicemail. Encouraged patient to call back if she has any questions or additional follow up.     Carolyn Garcia, [facility]  Behavioral Health Home- Social Work Care Coordinator

## 2025-03-05 ENCOUNTER — CARE COORDINATION (OUTPATIENT)
Dept: PSYCHOLOGY | Facility: CLINIC | Age: 30
End: 2025-03-05
Payer: COMMERCIAL

## 2025-03-05 NOTE — LETTER
Behavioral Health Home ([facility]): Health Action Plan  [facility]: [facility]      Name: Sada Paredes  Preferred Name: Jennifer  : 1995  MRN: [id number]    How to Contact me  Need for : No  Preferred Contact: [facility]    Home Phone [phone]   Mobile [phone]     Name and contact of juan supports (eg. Darling (mom) Phone number): Silviano - Partner        My Goals  Goal Areas: Health; Mental Health  Patient stated goals: Health:   -Jennifer would like to start exercising. Will do this by creating a schedule with partner to walk 2-4 times a week.   -Jennifer will schedule and arrive for follow up appointments with PCP every 6 months   -Jennifer will continue to consistently take medications, and reach out when needing refills.   -Jennifer will arrive for scheduled visits with dentist. Now connected at [facility] / emergency dental. Will arrive for next visit for fillings. (had wisdom teeth removed a few weeks ago)     Mental health:   -Jennifer has been placed on a waitlist at [place] of counseling (did not clarify how long). Would like to see provider here for mental health. Jennifer will notify [facility] team or PCP if needing to get connected with a therapist for bridging therapy if wait list is too long.   -Jennifer will maintain monthly communication with [facility] team members.  Strengths related to each goal: Jennifer has a strong support system to [place] [facility] with holding her accountable for her goals. Jennifer is able to communicate with team to relay needs.  Services and Supports Needed: Support from [facility] team and PCP team  Activities / Actions of Team to support goal(s): Maintain monthly communication with patient, have open conversation with PCP for follow up and reccomendaitons.  Activities / Actions of Patient / Parent / Guardian to support goal(s): [facility].        Recommended Referral  Tobacco cessation referrals made?: No  Mental Health / Chemical Dependency  Referrals: No  Substance Use Referrals: Not Applicable  Mental Health Referrals: None  Community Health Referrals: -- ([facility]- when available)            My Team Members and Their Contact Information  Patient Care Team         Relationship Specialty Notifications Start End    Kaur Sanchez MD PCP - General Student in organized health care education/training program  10/17/23     Phone: [phone] Fax: [phone]         [address] [place] [id number]    Ruth Melo OD MD Ophthalmology  3/30/23     Phone: [phone] Fax: [phone]         [address] [place] [address] [address] [place] [address]    Laura Duncan MD Referring Physician Family Medicine  3/30/23     Referring Physician to Eye    Phone: [phone] Fax: [phone]         [address] [place] [address] [address] [facility] [address] [facility] [address]    Millie Acosta, PhD Assigned Behavioral Health Provider   9/23/23     Phone: [phone] Fax: [phone] [address] [place] [address]    Kaur Sanchez MD Assigned PCP   10/21/23     Phone: [phone]          [address] [place] [address], [address] [facility] [address]    Alonzo Garcia  Clinic  3/6/24     patient enrolled in [facility]    Phone: [phone]         [facility]  3/6/24     patient enrolled in [facility]    Phone: [phone]                 My Wellness Plan  Safety Concerns: None Reported / Observed  Recommendations / Plan for safety concerns: ensure cellphone is fully charged, and dial 911 in case of an emergency          Sada Paredes co-developed the Health Action Plan with the [facility] Team and received a copy of this document.  Date Health Action Plan Completed/Updated: 03/05/25

## 2025-03-06 NOTE — PROGRESS NOTES
"Behavioral Health Home Services  [facility]: [place]        Social Work Care Navigator Note      Patient: Sada Paredes  Date: March 6, 2025  Preferred Name: Jennifer Smith PHQ-9:       8/16/2024     3:08 PM 9/30/2024     3:26 PM 3/5/2025     2:24 PM   PHQ-9 SCORE   PHQ-9 Total Score MyChart 6 (Mild depression)  5 (Mild depression)   PHQ-9 Total Score 6 4 5        Patient-reported     Previous TANG-7:       9/15/2023     4:13 PM 9/18/2023     9:27 AM 8/16/2024     3:08 PM   TANG-7 SCORE   Total Score  13 (moderate anxiety) 6 (mild anxiety)   Total Score 17 13    13 6     ARUNA LEVEL:       No data to display                Preferred Contact:  Need for : No  Preferred Contact: Zita      Type of Contact Today: Phone call (patient / identified key support person reached)    Service Modality: Phone Visit:      Provider verified identity through the following two step process.  Patient provided:  Patient is known previously to provider    Telephone Visit: The patient's condition can be safely assessed and treated via synchronous audio telemedicine encounter.      Reason for Audio Telemedicine Visit: Patient has requested telehealth visit    Originating Site (Patient Location): Patient's home    Distant Site (Provider Location): [facility]  Consent:  The patient/guardian has verbally consented to:     1. The potential risks and benefits of telemedicine (telephone visit) versus in person care;    The patient has been notified of the following:      \"We have found that certain health care needs can be provided without the need for a face to face visit.  This service lets us provide the care you need with a phone conversation.       I will have full access to your [facility] medical record during this entire phone call.  I will be taking notes for your medical record.      Since this is like an office visit, we will bill your insurance company for this service.       There are " "potential benefits and risks of telephone visits (e.g. limits to patient confidentiality) that differ from in-person visits.?Confidentiality still applies for telephone services, and nobody will record the visit.  It is important to be in a quiet, private space that is free of distractions (including cell phone or other devices) during the visit.??      If during the course of the call I believe a telephone visit is not appropriate, you will not be charged for this service\"     Consent has been obtained for this service by care team member: Yes       Data: (subjective / Objective):    Patient came in to complete the comprehensive wellness assessment for Behavioral Health Home Services.  See [facility] Flowsheets for details on the assessment.  See [facility], [facility] [place] for a copy of the patient's care plan.    Health: -Jennifer would like to start exercising. Will do this by creating a schedule with partner to walk 2-4 times a week. -Jennifer will schedule and arrive for follow up appointments with PCP every 6 months -Jennifer will continue to consistently take medications, and reach out when needing refills. -Jennifer will arrive for scheduled visits with dentist. Now connected at [facility] / emergency dental. Will arrive for next visit for fillings. (had wisdom teeth removed a few weeks ago)     Mental health: -Jennifer has been placed on a waitlist at [place] of counseling (did not clarify how long). Would like to see provider here for mental health. Jennifer will notify [facility] team or PCP if needing to get connected with a therapist for bridging therapy if wait list is too long. -Jennifer will maintain monthly communication with [facility] team members.         Carolyn Garcia, Social Work Care Coordinator          "

## 2025-03-26 ENCOUNTER — OFFICE VISIT (OUTPATIENT)
Dept: FAMILY MEDICINE | Facility: CLINIC | Age: 30
End: 2025-03-26
Payer: COMMERCIAL

## 2025-03-26 VITALS
WEIGHT: 248.7 LBS | DIASTOLIC BLOOD PRESSURE: 74 MMHG | BODY MASS INDEX: 36.84 KG/M2 | HEIGHT: 69 IN | SYSTOLIC BLOOD PRESSURE: 112 MMHG | OXYGEN SATURATION: 96 % | HEART RATE: 81 BPM | TEMPERATURE: 97.6 F | RESPIRATION RATE: 17 BRPM

## 2025-03-26 DIAGNOSIS — G44.209 TENSION HEADACHE: ICD-10-CM

## 2025-03-26 DIAGNOSIS — J34.89 NASAL MASS: Primary | ICD-10-CM

## 2025-03-26 PROCEDURE — 99214 OFFICE O/P EST MOD 30 MIN: CPT | Mod: GC

## 2025-03-26 RX ORDER — FLUTICASONE PROPIONATE 50 MCG
1 SPRAY, SUSPENSION (ML) NASAL DAILY
Qty: 15.8 ML | Refills: 0 | Status: SHIPPED | OUTPATIENT
Start: 2025-03-26

## 2025-03-26 ASSESSMENT — PAIN SCALES - GENERAL: PAINLEVEL_OUTOF10: NO PAIN (0)

## 2025-03-26 NOTE — PROGRESS NOTES
Preceptor Attestation:    I discussed the patient with the resident and evaluated the patient in person. I have verified the content of the note, which accurately reflects my assessment of the patient and the plan of care.   Supervising Physician:  Jermaine Fox MD.

## 2025-03-26 NOTE — PATIENT INSTRUCTIONS
Patient Education   Here is the plan from today's visit    1. Nasal mass (Primary)  - sodium chloride (OCEAN) 0.65 % nasal spray; 1 spray in each nostril 3-4 times per day  Dispense: 50 mL; Refill: 0  - fluticasone (FLONASE) 50 MCG/ACT nasal spray; Spray 1 spray into both nostrils daily.  Dispense: 15.8 mL; Refill: 0  - Adult ENT  Referral; Future    ENT: Please be aware that coverage of these services is subject to the terms and limitations of your health insurance plan.  Call member services at your health plan with any benefit or coverage questions.  [facility] will call you to coordinate your care as prescribed by the provider. If you don t hear from a representative within 2 business days, please call [phone].    2. Tension headache    [email or web address]        Please call or return to clinic if your symptoms don't go away.    Follow up plan  No follow-ups on file.    Thank you for coming to [facility] today.  Lab Testing:  **If you had lab testing today and your results are reassuring or normal they will be mailed to you or sent through [email or web address] Ã¨â€¹Â±Ã¨â€¦Â¾Ã§[id number]€˜ within 7 days.   **If the lab tests need quick action we will call you with the results.  **If you are having labs done on a different day, please call [phone] to schedule at [facility] or [phone] for other [facility] Outpatient Lab locations. Labs do not offer walk-in appointments.  The phone number we will call with results is # [phone] (home) . If this is not the best number please call our clinic and change the number.  Medication Refills:  If you need any refills please call your pharmacy and they will contact us.   If you need to  your refill at a new pharmacy, please contact the new pharmacy directly. The new pharmacy will help you get your medications transferred faster.   Scheduling:  If you have any concerns about today's visit or  wish to schedule another appointment please call our office during normal business hours [phone] (8-5:00 M-F). If you can no longer make a scheduled visit, please cancel via [email or web address] or call us to cancel.   If a referral was made to an [facility] specialty provider and you do not get a call from central scheduling, please refer to directions on your visit summary or call our office during normal business hours for assistance.   If a Mammogram was ordered for you at the Breast Center call [phone] to schedule or change your appointment.  If you had an XRay/CT/Ultrasound/MRI ordered the number is [phone] to schedule or change your radiology appointment.   [facility] has limited ultrasound appointments available on Wednesdays, if you would like your ultrasound at [facility], please call [phone] to schedule.   Medical Concerns:  If you have urgent medical concerns please call [phone] at any time of the day.    Guanakito Mahoney MD

## 2025-03-26 NOTE — PROGRESS NOTES
Assessment & Plan     Nasal mass, right side  Jennifer was noted to have a right-sided nasal mass on a 3D imaging done at the dentist's a few weeks ago. Was advised that the mass does not seem concerning, but she should discuss this with her PCP.   No hx of facial trauma or surgery. No nasal discharge, itchiness, or swelling. Does not snore often. However, has noticed decreased passage of air on the right side since she got COVID in 2022/2023. Historically, uses Mucomyst for congestion relief whenever she gets sick. Has a humidifier at home, uses it intermittently. On exam, nose and mouth without ulcers, lesions, masses, or discharge.   Unclear what the potential nasal mass could be - nasal polyp vs foreign body vs chronic rhinosinusitis. Advised pt to use nasal saline spray for 1-2 weeks and see if that improves air passage on the right side. If it does not, can also attempt Flonase for a week. Also placed referral to ENT for further evaluation of the potential nasal mass.   Jennifer will try to get imaging report from her dentist for further explanation of the mass.   - sodium chloride (OCEAN) 0.65 % nasal spray  Dispense: 50 mL; Refill: 0  - fluticasone (FLONASE) 50 MCG/ACT nasal spray  Dispense: 15.8 mL; Refill: 0  - Adult ENT  Referral    Tension headache  Patient mentioned at the end of the visit that she had noticed increased frequency of tension headaches over the last few months. Unclear if they resolve with OTC medication. Could be correlated with the potential nasal mass or could be medication-induced vs dehydration vs increased strain on the eyes.   Advised patient to keep a headache diary to see if there is a pattern and to follow-up in a month for further assessment. Also, encouraged staying hydrated throughout the day and minimizing eye strain, especially with screens.     Subjective   Jennifer is a 29 year old, presenting for the following health issues:  Nasal Problem (Right Nasal Blockage) and  "Headache      3/26/2025     1:45 PM   Additional Questions   Roomed by Joseluis   Accompanied by Self         3/26/2025    Information    services provided? No     HPI     # Righ-sided nasal blockage  - Went to a dentist a few weeks ago. They did a 3D imaging for cavities and noticed a blockage on the right side of the nose. At that time, Jennifer was not sick or recovering from sickness. Was informed by the dentist to get this checked out by PCP, not urgent.   - Pt noticed that she has had trouble breathing from the right side of the nose. This started a few years ago since getting COVID.   - Has a humidifier at home, uses it intermittently.   - No trauma/ surgery on the face. No discharge from the nose. No irritation, swelling. Just feels like there is decreased passage of air on the right side of the nose.   - Has been told that she rarely snores  - When she gets congested, takes mucomyst for relief.     # Headaches   - Mentioned at the end of the visit  - Has noticed increased frequency of headaches for the last few months  - headache diary     Objective    /74 (BP Location: Left arm, Patient Position: Sitting, Cuff Size: Adult Large)   Pulse 81   Temp 97.6  F (36.4  C) (Temporal)   Resp 17   Ht 1.753 m (5' 9\")   Wt 112.8 kg (248 lb 11.2 oz)   SpO2 96%   BMI 36.73 kg/m      Physical Exam   GENERAL: alert and no distress  HENT: Nose and mouth without ulcers, lesions, or masses  RESP: lungs clear to auscultation - no rales, rhonchi or wheezes  CV: regular rate and rhythm, normal S1 S2, no S3 or S4, no murmur, click or rub, no peripheral edema      Signed Electronically by: Guanakito Mahoney MD  "

## 2025-04-11 ENCOUNTER — TELEPHONE (OUTPATIENT)
Dept: PSYCHOLOGY | Facility: CLINIC | Age: 30
End: 2025-04-11
Payer: COMMERCIAL

## 2025-04-11 NOTE — TELEPHONE ENCOUNTER
[facility] phone call message- medication clarification/question:    Full Medication Name: Adderall XR   Dose: 20mg    Question: Jennifer went to  her medication. She states she is currently taking Adderall XR 20mg however, Adderall 20mg *short acting* was sent in.     Pharmacy confirmed as [id number] - [place], [place] - [facility] [address] AT [facility] [address]: Yes    OK to leave a message on voice mail? Yes    Primary language: English      needed? No    Call taken on April 11, 2025 at 9:17 AM by Dorothy Greenberg

## 2025-04-14 NOTE — TELEPHONE ENCOUNTER
Patient called to follow up on this as she returned the bottle of Adderall XR 20mg to the pharmacy and is currently out of medication. Wondering if another provider can review and send in correct script as PCP isn't back in clinic until 4/21.     DANYELLE Melchor

## 2025-04-16 DIAGNOSIS — F90.2 ATTENTION DEFICIT HYPERACTIVITY DISORDER, COMBINED TYPE: Primary | ICD-10-CM

## 2025-04-16 NOTE — PROGRESS NOTES
Covering Laura inbox. Per chart review from last ADHD visit 3/2025, patient should be on Adderall XR 20 mg AM and Adderall IR 10 mg PM. Reviewed PDMP- which reflects this. Patient did not  most recent prescription 4/4/25 because 20 mg IR was sent instead of 20 mg XR. Incorrect prescription cancelled. Correct prescription of Adderall XR 20 mg AM prescribed. Sent to preceptor for second sign.     Xiomy Ken MD  [facility], PGY-3

## 2025-04-17 RX ORDER — DEXTROAMPHETAMINE SACCHARATE, AMPHETAMINE ASPARTATE MONOHYDRATE, DEXTROAMPHETAMINE SULFATE AND AMPHETAMINE SULFATE 5; 5; 5; 5 MG/1; MG/1; MG/1; MG/1
20 CAPSULE, EXTENDED RELEASE ORAL DAILY
Qty: 30 CAPSULE | Refills: 0 | Status: SHIPPED | OUTPATIENT
Start: 2025-04-17 | End: 2025-05-17

## 2025-05-15 ENCOUNTER — TELEPHONE (OUTPATIENT)
Dept: PSYCHOLOGY | Facility: CLINIC | Age: 30
End: 2025-05-15
Payer: COMMERCIAL

## 2025-05-15 NOTE — TELEPHONE ENCOUNTER
"Behavioral Health Home Services  No data recorded    Social Work Care Navigator Note    Patient: Sada Paredes  Date: May 15, 2025  Preferred Name: Jennifer Smith PHQ-9:       8/16/2024     3:08 PM 9/30/2024     3:26 PM 3/5/2025     2:24 PM   PHQ-9 SCORE   PHQ-9 Total Score MyChart 6 (Mild depression)  5 (Mild depression)   PHQ-9 Total Score 6 4 5        Patient-reported     Previous TAGN-7:       9/15/2023     4:13 PM 9/18/2023     9:27 AM 8/16/2024     3:08 PM   TANG-7 SCORE   Total Score  13 (moderate anxiety) 6 (mild anxiety)   Total Score 17 13    13 6     ARUNA LEVEL:       No data to display                Preferred Contact:  No data recorded    Type of Contact Today: Phone call (patient / identified key support person reached)    Service Modality: Phone Visit:      Telephone Visit: The patient's condition can be safely assessed and treated via synchronous audio telemedicine encounter.      Reason for Audio Telemedicine Visit: Patient convenience (e.g. access to timely appointments / distance to available provider)    Originating Site (Patient Location): Patient's home    Distant Site (Provider Location): [facility]    Telephone visit completed due to the patient did not consent to a video visit.    Consent:  The patient/guardian has verbally consented to:     1. The potential risks and benefits of telemedicine (telephone visit) versus in person care;    The patient has been notified of the following:      \"We have found that certain health care needs can be provided without the need for a face to face visit.  This service lets us provide the care you need with a phone conversation.       I will have full access to your [facility] medical record during this entire phone call.  I will be taking notes for your medical record.      Since this is like an office visit, we will bill your insurance company for this service.       There are potential benefits and risks of telephone " "visits (e.g. limits to patient confidentiality) that differ from in-person visits.?Confidentiality still applies for telephone services, and nobody will record the visit.  It is important to be in a quiet, private space that is free of distractions (including cell phone or other devices) during the visit.??      If during the course of the call I believe a telephone visit is not appropriate, you will not be charged for this service\"     Consent has been obtained for this service by care team member: Yes       Data: (subjective / Objective):  Recent ED/IP Admission or Discharge?   None    Patient Goals:  Goal Areas: Health; Mental Health  Patient stated goals: Health:   -Jennifer would like to start exercising. Will do this by creating a schedule with partner to walk 2-4 times a week.   -Jennifer will schedule and arrive for follow up appointments with PCP every 6 months   -Jennifer will continue to consistently take medications, and reach out when needing refills.   -Jennifer will arrive for scheduled visits with dentist. Now connected at [facility] / emergency dental. Will arrive for next visit for fillings. (had wisdom teeth removed a few weeks ago)     Mental health:   -Jennifer has been placed on a waitlist at [place] of counseling (did not clarify how long). Would like to see provider here for mental health. Jennifer will notify [facility] team or PCP if needing to get connected with a therapist for bridging therapy if wait list is too long.   -Jennifer will maintain monthly communication with [facility] team members.        [facility] Core Service Provided:  Health and Wellness Promotion  Individual and Family Support: aimed to help clients reduce barriers to achieving goals, increase health literacy and knowledge about chronic condition(s), increase self-efficacy skills, and improve health outcomes    Current Stressors / Issues / Care Plan Objective Addressed Today:  Sada reports that she is doing well.  She was able to get her adderall prescription " last month and has been taking her medication regularly.  She had questions about her PCP's upcoming residency graduation which was addressed. Let patient know she would be transferred to a new PCP or if there was a person she had in mind, she could be assigned that person as well as long as their panel was open.  Jennifer thought provider had already graduated, was interested in making an appt with her before she graduates. Offered to have  call to schedule, but Jennifer said she would schedule through [facility].  Jennifer and her partner did get in for a group therapy at [place] which they attend weekly.  She is continuing to wait to start individual counseling there.  Sada states she will call if she needing assistance or resources for anything.    Assessment: (Progress on Goals / Homework):  Good progress      Plan: (Homework, other):  Patient was encouraged to continue to seek condition-related information and education.      Sada will make an appt with PCP through [facility]     Keyonna Wilson PsyD, Behavioral Health Clinician

## 2025-05-21 ENCOUNTER — MYC REFILL (OUTPATIENT)
Dept: FAMILY MEDICINE | Facility: CLINIC | Age: 30
End: 2025-05-21
Payer: COMMERCIAL

## 2025-05-21 DIAGNOSIS — F90.2 ATTENTION DEFICIT HYPERACTIVITY DISORDER, COMBINED TYPE: ICD-10-CM

## 2025-05-21 NOTE — TELEPHONE ENCOUNTER
"Request for medication refill:    Medication Name:       amphetamine-dextroamphetamine (ADDERALL) 20 MG tablet       Providers if patient needs an appointment and you are willing to give a one month supply please refill for one month and  send a MyChart using \".SMILLIMITEDREFILL\" .Or route chart to \"P [facility] \" . To call patient and inform of limited refill and providers request to make an appointment. (Giving one month refill in non controlled medications is strongly recommended before denial)    If refill has been denied, meaning absolutely no refills without visit, please complete the smart phrase \".SMIRXREFUSE\" and route it to the \"P [facility] MED REFILLS\"  pool to inform the patient and the pharmacy.    Jessee Vanessa MA     "

## 2025-05-26 RX ORDER — DEXTROAMPHETAMINE SACCHARATE, AMPHETAMINE ASPARTATE MONOHYDRATE, DEXTROAMPHETAMINE SULFATE AND AMPHETAMINE SULFATE 5; 5; 5; 5 MG/1; MG/1; MG/1; MG/1
20 CAPSULE, EXTENDED RELEASE ORAL DAILY
Qty: 30 CAPSULE | Refills: 0 | Status: SHIPPED | OUTPATIENT
Start: 2025-05-26

## 2025-06-17 ENCOUNTER — VIRTUAL VISIT (OUTPATIENT)
Dept: FAMILY MEDICINE | Facility: CLINIC | Age: 30
End: 2025-06-17
Payer: COMMERCIAL

## 2025-06-17 ENCOUNTER — CARE COORDINATION (OUTPATIENT)
Dept: FAMILY MEDICINE | Facility: CLINIC | Age: 30
End: 2025-06-17

## 2025-06-17 DIAGNOSIS — F90.2 ATTENTION DEFICIT HYPERACTIVITY DISORDER, COMBINED TYPE: ICD-10-CM

## 2025-06-17 RX ORDER — DEXTROAMPHETAMINE SACCHARATE, AMPHETAMINE ASPARTATE, DEXTROAMPHETAMINE SULFATE AND AMPHETAMINE SULFATE 2.5; 2.5; 2.5; 2.5 MG/1; MG/1; MG/1; MG/1
10 TABLET ORAL DAILY
Qty: 30 TABLET | Refills: 0 | Status: SHIPPED | OUTPATIENT
Start: 2025-07-17 | End: 2025-08-16

## 2025-06-17 RX ORDER — DEXTROAMPHETAMINE SACCHARATE, AMPHETAMINE ASPARTATE MONOHYDRATE, DEXTROAMPHETAMINE SULFATE AND AMPHETAMINE SULFATE 5; 5; 5; 5 MG/1; MG/1; MG/1; MG/1
20 CAPSULE, EXTENDED RELEASE ORAL DAILY
Qty: 30 CAPSULE | Refills: 0 | Status: CANCELLED | OUTPATIENT
Start: 2025-06-17

## 2025-06-17 RX ORDER — DEXTROAMPHETAMINE SACCHARATE, AMPHETAMINE ASPARTATE MONOHYDRATE, DEXTROAMPHETAMINE SULFATE AND AMPHETAMINE SULFATE 5; 5; 5; 5 MG/1; MG/1; MG/1; MG/1
20 CAPSULE, EXTENDED RELEASE ORAL DAILY
Qty: 30 CAPSULE | Refills: 0 | Status: SHIPPED | OUTPATIENT
Start: 2025-06-17 | End: 2025-07-17

## 2025-06-17 RX ORDER — DEXTROAMPHETAMINE SACCHARATE, AMPHETAMINE ASPARTATE MONOHYDRATE, DEXTROAMPHETAMINE SULFATE AND AMPHETAMINE SULFATE 5; 5; 5; 5 MG/1; MG/1; MG/1; MG/1
20 CAPSULE, EXTENDED RELEASE ORAL DAILY
Qty: 30 CAPSULE | Refills: 0 | Status: SHIPPED | OUTPATIENT
Start: 2025-08-16 | End: 2025-09-15

## 2025-06-17 RX ORDER — DEXTROAMPHETAMINE SACCHARATE, AMPHETAMINE ASPARTATE, DEXTROAMPHETAMINE SULFATE AND AMPHETAMINE SULFATE 2.5; 2.5; 2.5; 2.5 MG/1; MG/1; MG/1; MG/1
10 TABLET ORAL DAILY
Qty: 30 TABLET | Refills: 0 | Status: SHIPPED | OUTPATIENT
Start: 2025-06-17 | End: 2025-07-17

## 2025-06-17 RX ORDER — DEXTROAMPHETAMINE SACCHARATE, AMPHETAMINE ASPARTATE, DEXTROAMPHETAMINE SULFATE AND AMPHETAMINE SULFATE 2.5; 2.5; 2.5; 2.5 MG/1; MG/1; MG/1; MG/1
10 TABLET ORAL DAILY
Qty: 30 TABLET | Refills: 0 | Status: SHIPPED | OUTPATIENT
Start: 2025-08-16 | End: 2025-09-15

## 2025-06-17 RX ORDER — DEXTROAMPHETAMINE SACCHARATE, AMPHETAMINE ASPARTATE MONOHYDRATE, DEXTROAMPHETAMINE SULFATE AND AMPHETAMINE SULFATE 5; 5; 5; 5 MG/1; MG/1; MG/1; MG/1
20 CAPSULE, EXTENDED RELEASE ORAL DAILY
Qty: 30 CAPSULE | Refills: 0 | Status: SHIPPED | OUTPATIENT
Start: 2025-07-17 | End: 2025-08-16

## 2025-06-17 NOTE — PATIENT INSTRUCTIONS
----- Message from Patient Portal,  sent at 12/18/2022  6:02 AM CST -----  Regarding: Notification of Unviewed Test Results  Contact: [phone]  ARCHIE LEWIS has not viewed the following results:  - URIC ACID  - CHACHO SCREEN WITH ANTIBODY AND IFA REFLEX  - SEDIMENTATION RATE WESTERGREN  - C REACTIVE PROTEIN  - GLYCOHEMOGLOBIN   Patient Education   Here is the plan from today's visit    1. Attention deficit hyperactivity disorder, combined type  - amphetamine-dextroamphetamine (ADDERALL XR) 20 MG 24 hr capsule; Take 1 capsule (20 mg) by mouth daily.  Dispense: 30 capsule; Refill: 0  - amphetamine-dextroamphetamine (ADDERALL XR) 20 MG 24 hr capsule; Take 1 capsule (20 mg) by mouth daily.  Dispense: 30 capsule; Refill: 0  - amphetamine-dextroamphetamine (ADDERALL XR) 20 MG 24 hr capsule; Take 1 capsule (20 mg) by mouth daily.  Dispense: 30 capsule; Refill: 0  - amphetamine-dextroamphetamine (ADDERALL) 10 MG tablet; Take 1 tablet (10 mg) by mouth daily.  Dispense: 30 tablet; Refill: 0  - amphetamine-dextroamphetamine (ADDERALL) 10 MG tablet; Take 1 tablet (10 mg) by mouth daily.  Dispense: 30 tablet; Refill: 0  - amphetamine-dextroamphetamine (ADDERALL) 10 MG tablet; Take 1 tablet (10 mg) by mouth daily.  Dispense: 30 tablet; Refill: 0    Please call or return to clinic if your symptoms don't go away.    Follow up plan  Return in about 6 months (around 12/17/2025).    Thank you for coming to [facility] today.  Lab Testing:  **If you had lab testing today and your results are reassuring or normal they will be mailed to you or sent through [employer] within 7 days.   **If the lab tests need quick action we will call you with the results.  **If you are having labs done on a different day, please call [phone] to schedule at [facility] or [phone] for other [facility] locations. Labs do not offer walk-in appointments.  The phone number we will call with results is # [phone] (home) . If this is not the best number please call our clinic and change the number.  Medication Refills:  If you need any refills please call your pharmacy and they will contact us.   If you need to  your refill at a new pharmacy, please contact the new pharmacy directly. The new pharmacy will help you get your medications  transferred faster.   Scheduling:  If you have any concerns about today's visit or wish to schedule another appointment please call our office during normal business hours [phone] (8-5:00 M-F). If you can no longer make a scheduled visit, please cancel via [employer] or call us to cancel.   If a referral was made to an [facility] specialty provider and you do not get a call from central scheduling, please refer to directions on your visit summary or call our office during normal business hours for assistance.   If a Mammogram was ordered for you at the [facility] call [phone] to schedule or change your appointment.  If you had an XRay/CT/Ultrasound/MRI ordered the number is [phone] to schedule or change your radiology appointment.   [facility] has limited ultrasound appointments available on Wednesdays, if you would like your ultrasound at [facility], please call [phone] to schedule.   Medical Concerns:  If you have urgent medical concerns please call [phone] at any time of the day.    Kaur Sanchez MD

## 2025-06-17 NOTE — PROGRESS NOTES
"[facility]  No data recorded      Social Work Care Navigator Note      Patient: Sada Paredes  Date: June 17, 2025  Preferred Name: Jennifer    Previous PHQ-9:       8/16/2024     3:08 PM 9/30/2024     3:26 PM 3/5/2025     2:24 PM   PHQ-9 SCORE   PHQ-9 Total Score MyChart 6 (Mild depression)  5 (Mild depression)   PHQ-9 Total Score 6 4 5        Patient-reported     Previous TANG-7:       9/15/2023     4:13 PM 9/18/2023     9:27 AM 8/16/2024     3:08 PM   TANG-7 SCORE   Total Score  13 (moderate anxiety) 6 (mild anxiety)   Total Score 17 13    13 6     ARUNA LEVEL:       No data to display                Preferred Contact:  No data recorded    Type of Contact Today: Phone call (patient / identified key support person reached)      Data: (subjective / Objective):  Recent ED/IP Admission or Discharge?   None    Patient Goals:  Goal Areas: Health; Mental Health  Patient stated goals: Health:   -Jennifer would like to start exercising. Will do this by creating a schedule with partner to walk 2-4 times a week.   -Jennifer will schedule and arrive for follow up appointments with PCP every 6 months   -Jennifer will continue to consistently take medications, and reach out when needing refills.   -Jennifer will arrive for scheduled visits with dentist. Now connected at [facility] / emergency dental. Will arrive for next visit for fillings. (had wisdom teeth removed a few weeks ago)     Mental health:   -Jennifer has been placed on a waitlist at [facility] of counseling (did not clarify how long). Would like to see provider here for mental health. Jennifer will notify [facility] team or PCP if needing to get connected with a therapist for bridging therapy if wait list is too long.   -Jennifer will maintain monthly communication with [facility] team members.        [facility] Core Service Provided:  Health and Wellness Promotion    Current Stressors / Issues / Care Plan Objective Addressed Today:  Patient reports she is doing \"okay\" at the moment. Patient " states there has been some health concerns with other family members that have been stressful but feels that she has been able to manage this stress thus far.     Patient received her first food box and stated that there was a lot of options and was excited to try it all.     Patient did not have any questions or concerns at the moment and stated she would call the clinic/[facility] team if there was anything that she needed assistance with.     No upcoming appointments noted in patient's chart.     Intervention:  Motivational Interviewing: Supported Autonomy, Collaboration, Evocation   Target Behavior(s): Explored current social supports and reinforced opportunities to increase engagement    Assessment: (Progress on Goals / Homework):  Patient will maintain communication with care team and [facility] team. Patient will arrive for scheduled visits.     Plan: (Homework, other):  Patient was encouraged to continue to seek condition-related information and education.         Coy Marino, Social Work Care Coordinator

## 2025-06-17 NOTE — PROGRESS NOTES
Jennifer is a 29 year old who is being evaluated via a billable video visit.          Assessment & Plan     Attention deficit hyperactivity disorder, combined type  Stable on Adderall 20mg XR in morning, Adderall 10mg IR in afternoon. No concerns. Mood is generally well, doing weekly individual and couples therapy for concomitant mood disorders. De for yearly UDS, please complete at next visit.  - amphetamine-dextroamphetamine (ADDERALL XR) 20 MG 24 hr capsule; Take 1 capsule (20 mg) by mouth daily.  - amphetamine-dextroamphetamine (ADDERALL XR) 20 MG 24 hr capsule; Take 1 capsule (20 mg) by mouth daily.  - amphetamine-dextroamphetamine (ADDERALL XR) 20 MG 24 hr capsule; Take 1 capsule (20 mg) by mouth daily.  - amphetamine-dextroamphetamine (ADDERALL) 10 MG tablet; Take 1 tablet (10 mg) by mouth daily.  - amphetamine-dextroamphetamine (ADDERALL) 10 MG tablet; Take 1 tablet (10 mg) by mouth daily.  - amphetamine-dextroamphetamine (ADDERALL) 10 MG tablet; Take 1 tablet (10 mg) by mouth daily.      Follow up in 6 months for ADHD and gender care follow up.    The longitudinal plan of care for the diagnosis(es)/condition(s) as documented were addressed during this visit. Due to the added complexity in care, I will continue to support Jennifer in the subsequent management and with ongoing continuity of care.      Subjective   Jennifer is a 29 year old, presenting for the following health issues:  Follow Up and Refill Request (Adderrall 20mg XR and Adderrall 10mg)      6/17/2025    11:21 AM   Additional Questions   Roomed by Les         6/17/2025    Information    services provided? No     Video Start Time: 11:21    HPI      ADHD Follow-Up    Changes since last visit: No:   Status since last visit: Stable  Taking controlled (daily) medications as prescribed: Yes  Sleep: no problems  Doing couples and individual therapy once a week each    ++++++++++++++++++++++++++++++++++++++++++++++++    Home/Family Concerns:  Stable    Co-Morbid Diagnosis: Depression and Anxiety    Medication Benefits:   Controlled symptoms: Hyperactivity - motor restlessness, Attention span, Distractability, and Finishing tasks  Uncontrolled symptoms: None    Medication Side Effects:  Has:  none  Denies:  appetite suppression, weight loss, insomnia, headache, and emotional lability    Medication Monitoring:   Reviewed? Yes: reflects med list  Urine drug screen in last 1 year? Due, will complete at next scheduled visit  Visit Schedule? 6 months Next office visit due: 12/17/2025            Objective           Vitals:  No vitals were obtained today due to virtual visit.    Physical Exam   GENERAL: alert and no distress  EYES: Eyes grossly normal to inspection.  No discharge or erythema, or obvious scleral/conjunctival abnormalities.  RESP: No audible wheeze, cough, or visible cyanosis.    SKIN: Visible skin clear. No significant rash, abnormal pigmentation or lesions.  NEURO: Cranial nerves grossly intact.  Mentation and speech appropriate for age.  PSYCH: Appropriate affect, tone, and pace of words        Video-Visit Details    Type of service:  Video Visit   Video End Time:11:39  Originating Location (pt. Location): Home    Distant Location (provider location):  On-site  Platform used for Video Visit: Radames  Signed Electronically by: Kaur Sanchez MD

## 2025-06-18 ENCOUNTER — DOCUMENTATION ONLY (OUTPATIENT)
Dept: PSYCHOLOGY | Facility: CLINIC | Age: 30
End: 2025-06-18
Payer: COMMERCIAL

## 2025-06-18 NOTE — PROGRESS NOTES
[facility]  Type of Contact: Phone call (patient / identified key support person reached)  Keyonna Wilson PsyD,     Health and Wellness Promotion    Reviewed SW  [facility] contact note from 6/17.

## 2025-07-16 ENCOUNTER — CARE COORDINATION (OUTPATIENT)
Dept: PSYCHOLOGY | Facility: CLINIC | Age: 30
End: 2025-07-16
Payer: COMMERCIAL

## 2025-07-16 NOTE — PROGRESS NOTES
"[facility]  Type of Contact: Phone call (patient / identified key support person reached)  Carolyn Garcia,     Care Coordination: provided care management services/referrals necessary to ensure patient and their identified supports have access to medical, behavioral health, pharmacology and recovery support services.  Ensured that patient's care is integrated across all settings and services.     Telephone visit with patient today along with their partner Silviano. Discussed current stressors. They state they are currently dealing with trying to find a new place to live in 2 weeks. They state their lease is up and need to be out by August 1st. They thought they would have additional support / have found a new place to move but have not yet.     They did qualify for housing stabilization services back in June but have not yet been connected and not satisfied with how slow it is going. They are trying now to find a place on their own.     They state there is a pest infestation, and their AC has been broken. They state this put a pause on them cleaning to when maintenance came in to fix AC they stated their apartment was not up to code for sanitation.  Per patient and spouse Silviano, they have outreached to \"house Calls\" through [facility] to see what support is available related to sanitation support.     SW discussed other housing search options such as [employer] link. Patient receptive and stated they would look into it. No other acute needs discussed at this time. Patient really trying to focus on housing search.     SW did disclose to patient that SW will be leaving role with [facility]. Notified them that letter will be sent out in the mail as well. Discussed calling  after SW is gone to direct call appropriately.     MARYAM Hayes  [facility]- Social Work Care Coordinator       "

## 2025-08-11 ENCOUNTER — OFFICE VISIT (OUTPATIENT)
Dept: URGENT CARE | Facility: URGENT CARE | Age: 30
End: 2025-08-11
Payer: COMMERCIAL

## 2025-08-11 VITALS
RESPIRATION RATE: 16 BRPM | SYSTOLIC BLOOD PRESSURE: 132 MMHG | TEMPERATURE: 97.3 F | BODY MASS INDEX: 35.55 KG/M2 | OXYGEN SATURATION: 98 % | DIASTOLIC BLOOD PRESSURE: 74 MMHG | HEIGHT: 69 IN | WEIGHT: 240 LBS | HEART RATE: 94 BPM

## 2025-08-11 DIAGNOSIS — J06.9 UPPER RESPIRATORY TRACT INFECTION, UNSPECIFIED TYPE: Primary | ICD-10-CM

## 2025-08-11 DIAGNOSIS — R68.89 FLU-LIKE SYMPTOMS: ICD-10-CM

## 2025-08-11 PROCEDURE — 99213 OFFICE O/P EST LOW 20 MIN: CPT | Performed by: FAMILY MEDICINE
